# Patient Record
Sex: FEMALE | Race: WHITE | URBAN - METROPOLITAN AREA
[De-identification: names, ages, dates, MRNs, and addresses within clinical notes are randomized per-mention and may not be internally consistent; named-entity substitution may affect disease eponyms.]

---

## 2017-05-08 ENCOUNTER — OFFICE VISIT (OUTPATIENT)
Dept: FAMILY MEDICINE CLINIC | Age: 37
End: 2017-05-08

## 2017-05-08 VITALS
BODY MASS INDEX: 36.5 KG/M2 | TEMPERATURE: 98.3 F | HEIGHT: 63 IN | HEART RATE: 68 BPM | WEIGHT: 206 LBS | RESPIRATION RATE: 18 BRPM | DIASTOLIC BLOOD PRESSURE: 98 MMHG | OXYGEN SATURATION: 99 % | SYSTOLIC BLOOD PRESSURE: 149 MMHG

## 2017-05-08 DIAGNOSIS — B37.31 VAGINAL YEAST INFECTION: Primary | ICD-10-CM

## 2017-05-08 DIAGNOSIS — R03.0 ELEVATED BLOOD PRESSURE READING: ICD-10-CM

## 2017-05-08 DIAGNOSIS — B37.2 YEAST DERMATITIS: ICD-10-CM

## 2017-05-08 LAB
BILIRUB UR QL STRIP: NEGATIVE
GLUCOSE UR-MCNC: NEGATIVE MG/DL
KETONES P FAST UR STRIP-MCNC: NEGATIVE MG/DL
PH UR STRIP: 6 [PH] (ref 4.6–8)
PROT UR QL STRIP: NEGATIVE MG/DL
SP GR UR STRIP: 1.02 (ref 1–1.03)
UA UROBILINOGEN AMB POC: NORMAL (ref 0.2–1)
URINALYSIS CLARITY POC: CLEAR
URINALYSIS COLOR POC: YELLOW
URINE BLOOD POC: NORMAL
URINE LEUKOCYTES POC: NEGATIVE
URINE NITRITES POC: NEGATIVE

## 2017-05-08 RX ORDER — FLUCONAZOLE 150 MG/1
150 TABLET ORAL DAILY
Qty: 2 TAB | Refills: 0 | Status: SHIPPED | OUTPATIENT
Start: 2017-05-08 | End: 2017-05-09

## 2017-05-08 RX ORDER — CITALOPRAM 20 MG/1
TABLET, FILM COATED ORAL DAILY
COMMUNITY
End: 2017-05-15 | Stop reason: SDUPTHER

## 2017-05-08 RX ORDER — NYSTATIN AND TRIAMCINOLONE ACETONIDE 100000; 1 [USP'U]/G; MG/G
OINTMENT TOPICAL 2 TIMES DAILY
Qty: 30 G | Refills: 0 | Status: SHIPPED | OUTPATIENT
Start: 2017-05-08 | End: 2017-06-20 | Stop reason: ALTCHOICE

## 2017-05-08 NOTE — PROGRESS NOTES
HPI:    Chief Complaint   Patient presents with    New Patient     poss yeast infection        Summer Lanre Heck is a 29 y.o. female who is a new patient to me that presents for possible yeast infection with vaginal itching and burning and around the entire outside. She says that about two weeks ago she was at [facility] and was wearing a pair of athletic shorts that had mesh in it and thinks that they rubbed her raw. She had a yeast infection previously, last was in December. She denies any recent antibiotic use or other infections. She denies any odor, dysmenorrhea, or dyspareunia. Patient's last menstrual period was 04/14/2017 (approximate). She states her periods are regular with a moderate flow. There have been no recent changes in her menstrual cycle. She is sexually active with 1 partner ( in monogamous relationship). She currently uses nothing ( had vasectomy)  for birth control. Last pap: Within the last year and was normal (2/2017)  Last GYN eval: within the last year- normal.  Denies associated vaginal symptoms pain with sex. Denies STI concern or past history of STI. Denies associated urinary symptoms including burning, frequency, CVA tenderness or malodorous urine. Denies abdominal pain, nausea, vomiting or diarrhea. Denies polyuria or polydipsia. Denies fever, myalgias, chills, weakness, fatigue and other systemic symptoms. Feels well and ROS is otherwise negative. She says she does have a history of elevated blood pressure. Was on \"some medicine in the past but it made me feel bad and I stopped taking it\". She denies HA or vision changes. Says that she usually watches her diet, does not add salt but admits that she has gained weight in the past two years. She has not been exercising like she \"used to\" either, no formal exercise.       Past Medical History:   Diagnosis Date    Hypertension      Social History   Substance Use Topics    Smoking status: Current Some Day Smoker     Packs/day: 0.50    Smokeless tobacco: None    Alcohol use Yes     Current Outpatient Prescriptions   Medication Sig Dispense    citalopram (CELEXA) 20 mg tablet Take  by mouth daily. No current facility-administered medications for this visit. Not on File  Agree with nurses note. PE:  Visit Vitals    BP (!) 149/98 (BP 1 Location: Left arm, BP Patient Position: Sitting)    Pulse 68    Temp 98.3 °F (36.8 °C) (Oral)    Resp 18    Ht 5' 3\" (1.6 m)    Wt 206 lb (93.4 kg)    LMP 04/14/2017 (Approximate)    SpO2 99%    BMI 36.49 kg/m2     Gen: alert, oriented, no acute distress  HEENT:  Normocephalic, no conjunctival inflammation, PERRL,MMM, no oral lesions  Neck: midline trachea, normal thyroid  Resp: no increase work of breathing, lungs clear to ausculation bilaterally, no wheezing, rales or rhonchi  Breast: no skin changes, no nipple discharge, no palpable masses  CV: S1, S2 normal, no murmurs, rubs, or gallops. Abd: soft, not tender, not distended. No hepatosplenomegaly. Normal bowel sounds. No hernias. No abdominal or renal bruits. /gyn: external genitalia intact with inflammation- excoriated rash consistent with yeast dermatitis to bilateral groins, no ulceration, lesions or discharge. Neuro: cranial nerves intact, normal strength and movement in all extremities.   Skin: no lesion or rash noted elsewhere  Extremities: no cyanosis or edema    Results for orders placed or performed in visit on 05/08/17   AMB POC URINALYSIS DIP STICK AUTO W/O MICRO   Result Value Ref Range    Color (UA POC) Yellow     Clarity (UA POC) Clear     Glucose (UA POC) Negative Negative    Bilirubin (UA POC) Negative Negative    Ketones (UA POC) Negative Negative    Specific gravity (UA POC) 1.020 1.001 - 1.035    Blood (UA POC) Trace Negative    pH (UA POC) 6.0 4.6 - 8.0    Protein (UA POC) Negative Negative mg/dL    Urobilinogen (UA POC) 0.2 mg/dL 0.2 - 1    Nitrites (UA POC) Negative Negative    Leukocyte esterase (UA POC) Negative Negative           Assessment/Plan:  Differential diagnosis and treatment options reviewed with patient who is in agreement with treatment plan as outlined below. ICD-10-CM ICD-9-CM    1. Vaginal yeast infection B37.3 112.1 AMB POC URINALYSIS DIP STICK AUTO W/O MICRO      fluconazole (DIFLUCAN) 150 mg tablet   2. Yeast dermatitis B37.2 112.3 fluconazole (DIFLUCAN) 150 mg tablet      nystatin-triamcinolone (MYCOLOG) 100,000-0.1 unit/gram-% ointment   3. Elevated blood pressure reading R03.0 796.2        Discussed potential causes and care of yeast infection as well as prevention of future yeast infections. Preventive measures help to reduce factors that promote candidal infection and skin irritation. Encouraged her to avoid occlusive clothing and promote aeration of susceptible areas. Discussed safe sexual practices. Reviewed and given written instructions on symptomatic treatment, see AVS.  F/u if symptoms progress or do not improve    Discussed elevated BP. DASH diet encouraged and Discussed BMI and healthy weight. Encouraged patient to work to implement changes including diet high in raw fruits and vegetables, lean protein and good fats. Limit refined, processed carbohydrates and sugar. Encouraged regular exercise. Encouraged her to monitor her BP at home. Return in 3-4 weeks for physical and routine labs. Will discuss adding antihypertensive at that time. Verbal and written instructions (see AVS) provided. Patient expresses understanding and agreement of diagnosis and treatment plan.

## 2017-05-08 NOTE — MR AVS SNAPSHOT
Visit Information Date & Time Provider Department Dept. Phone Encounter #  
 5/8/2017  3:00 PM Mitra Pelletier, Praveen Schmitz [place] [address] [phone] [id number] Follow-up Instructions Return in about 4 weeks (around 6/5/2017), or if symptoms worsen or fail to improve. Upcoming Health Maintenance Date Due DTaP/Tdap/Td series (1 - Tdap) 5/28/2002 PAP AKA CERVICAL CYTOLOGY 5/28/2002 INFLUENZA AGE 9 TO ADULT 8/1/2017 Allergies as of 5/8/2017  Review Complete On: 5/8/2017 By: Marcelo Amaya Not on File Current Immunizations  Never Reviewed No immunizations on file. Not reviewed this visit You Were Diagnosed With   
  
 Codes Comments Vaginal yeast infection    -  Primary ICD-10-CM: B37.3 ICD-9-CM: 112.1 Yeast dermatitis     ICD-10-CM: B37.2 ICD-9-CM: 112.3 Elevated blood pressure reading     ICD-10-CM: R03.0 ICD-9-CM: 796.2 Vitals BP Pulse Temp Resp Height(growth percentile) Weight(growth percentile) (!) 149/98 (BP 1 Location: Left arm, BP Patient Position: Sitting) 68 98.3 °F (36.8 °C) (Oral) 18 5' 3\" (1.6 m) 206 lb (93.4 kg) LMP SpO2 BMI OB Status Smoking Status 04/14/2017 (Approximate) 99% 36.49 kg/m2 Having regular periods Current Some Day Smoker BMI and BSA Data Body Mass Index Body Surface Area  
 36.49 kg/m 2 2.04 m 2 Preferred Pharmacy Pharmacy Name Phone [facility] [phone] Your Updated Medication List  
  
   
This list is accurate as of: 5/8/17  3:57 PM.  Always use your most recent med list.  
  
  
  
  
 CeleXA 20 mg tablet Generic drug:  citalopram  
Take  by mouth daily. fluconazole 150 mg tablet Commonly known as:  DIFLUCAN Take 1 Tab by mouth daily for 1 day. Repeat dose in 3-5 days  
  
 nystatin-triamcinolone 100,000-0.1 unit/gram-% ointment Commonly known as:  Justice Baptise Apply  to affected area two (2) times a day. Prescriptions Sent to Pharmacy Refills  
 fluconazole (DIFLUCAN) 150 mg tablet 0 Sig: Take 1 Tab by mouth daily for 1 day. Repeat dose in 3-5 days Class: Normal  
 Pharmacy: [address] Ph #: [phone] Route: Oral  
 nystatin-triamcinolone (MYCOLOG) 100,000-0.1 unit/gram-% ointment 0 Sig: Apply  to affected area two (2) times a day. Class: Normal  
 Pharmacy: [address] Ph #: [phone] Route: Topical  
  
We Performed the Following AMB POC URINALYSIS DIP STICK AUTO W/O MICRO [51292 CPT(R)] Follow-up Instructions Return in about 4 weeks (around 6/5/2017), or if symptoms worsen or fail to improve. Patient Instructions Vaginal Yeast Infection: Care Instructions Your Care Instructions A vaginal yeast infection is caused by too many yeast cells in the vagina. This is common in women of all ages. Itching, vaginal discharge and irritation, and other symptoms can bother you. But yeast infections don't often cause other health problems. Some medicines can increase your risk of getting a yeast infection. These include antibiotics, birth control pills, hormones, and steroids. You may also be more likely to get a yeast infection if you are pregnant, have diabetes, douche, or wear tight clothes. With treatment, most yeast infections get better in 2 to 3 days. Follow-up care is a key part of your treatment and safety. Be sure to make and go to all appointments, and call your doctor if you are having problems. It's also a good idea to know your test results and keep a list of the medicines you take. How can you care for yourself at home? · Take your medicines exactly as prescribed. Call your doctor if you think you are having a problem with your medicine. · Ask your doctor about over-the-counter (OTC) medicines for yeast infections. They may cost less than prescription medicines. If you use an OTC treatment, read and follow all instructions on the label. · Do not use tampons while using a vaginal cream or suppository. The tampons can absorb the medicine. Use pads instead. · Wear loose cotton clothing. Do not wear nylon or other fabric that holds body heat and moisture close to the skin. · Try sleeping without underwear. · Do not scratch. Relieve itching with a cold pack or a cool bath. · Do not wash your vaginal area more than once a day. Use plain water or a mild, unscented soap. Air-dry the vaginal area. · Change out of wet swimsuits after swimming. · Do not have sex until you have finished your treatment. · Do not douche. When should you call for help? Call your doctor now or seek immediate medical care if: 
· You have unexpected vaginal bleeding. · You have new or increased pain in your vagina or pelvis. Watch closely for changes in your health, and be sure to contact your doctor if: 
· You have a fever. · You are not getting better after 2 days. · Your symptoms come back after you finish your medicines. Where can you learn more? Go to http://sonia-sagrario.info/. Enter U583 in the search box to learn more about \"Vaginal Yeast Infection: Care Instructions. \" Current as of: October 13, 2016 Content Version: 11.2 © 0343-4540 [employer]. Care instructions adapted under license by [employer] (which disclaims liability or warranty for this information). If you have questions about a medical condition or this instruction, always ask your healthcare professional. [place] [address] any warranty or liability for your use of this information. Candidiasis: Care Instructions Your Care Instructions Candidiasis (say \"zxx-mzl-ZR-uh-kriss\") is a yeast infection.  Yeast normally lives in your body. But it can cause problems if your body's defenses don't work as they should. Some medicines can increase your chance of getting a yeast infection. These include antibiotics, steroids, and cancer drugs. And some diseases like AIDS and diabetes can make you more likely to get yeast infections. There are different types of yeast infections. Elina Larar is a yeast infection in the mouth. It usually occurs in people with weak immune systems. It causes white patches inside the mouth and throat. Yeast infections of the skin usually occur in skin folds where the skin stays moist. They cause red, oozing patches on your skin. Babies can get these infections under the diaper. People who often wear gloves can get them on their hands. Many women get vaginal yeast infections. They are most common when women take antibiotics. These infections can cause the vagina to itch and burn. They also cause white discharge that looks like cottage cheese. In rare cases, yeast infects the blood. This can cause serious disease. This kind of infection is treated with medicine given through a needle into a vein (IV). After you start treatment, a yeast infection usually goes away quickly. But if your immune system is weak, the infection may come back. Tell your doctor if you get yeast infections often. Follow-up care is a key part of your treatment and safety. Be sure to make and go to all appointments, and call your doctor if you are having problems. It's also a good idea to know your test results and keep a list of the medicines you take. How can you care for yourself at home? · Take your medicines exactly as prescribed. Call your doctor if you think you are having a problem with your medicine. · Use antibiotics only as directed by your doctor. · Eat yogurt with live cultures. It has bacteria called lactobacillus. It may help prevent some types of yeast infections. · Keep your skin clean and dry. Put powder on moist places. · If you are using a cream or suppository to treat a vaginal yeast infection, don't use condoms or a diaphragm. Use a different type of birth control. · Eat a healthy diet and get regular exercise. This will help keep your immune system strong. When should you call for help? Call your doctor now or seek immediate medical care if: 
· You have a fever. · You are pregnant and have signs of a vaginal or urinary tract infection such as: ¨ Severe itching in your vagina. ¨ Pain during sex or when you urinate. ¨ Unusual discharge from your vagina. ¨ A frequent urge to urinate. ¨ Urine that is cloudy or smells bad. Watch closely for changes in your health, and be sure to contact your doctor if: 
· You do not get better as expected. Where can you learn more? Go to http://sonia-sagrario.info/. Enter D239 in the search box to learn more about \"Candidiasis: Care Instructions. \" Current as of: October 13, 2016 Content Version: 11.2 © 6885-5881 [employer]. Care instructions adapted under license by [employer] (which disclaims liability or warranty for this information). If you have questions about a medical condition or this instruction, always ask your healthcare professional. [address] any warranty or liability for your use of this information. Introducing [facility]! [employer] introduces PushPoint patient portal. Now you can access parts of your medical record, email your doctor's office, and request medication refills online. 1. In your internet browser, go to [email or web address] 2. Click on the First Time User? Click Here link in the Sign In box. You will see the New Member Sign Up page. 3. Enter your PushPoint Access Code exactly as it appears below. You will not need to use this code after youve completed the sign-up process.  If you do not sign up before the expiration date, you must request a new code. · [employer] Access Code: [email or web address] Expires: 8/6/2017  3:57 PM 
 
4. Enter the last four digits of your Social Security Number (xxxx) and Date of Birth (mm/dd/yyyy) as indicated and click Submit. You will be taken to the next sign-up page. 5. Create a [employer] ID. This will be your [employer] login ID and cannot be changed, so think of one that is secure and easy to remember. 6. Create a [employer] password. You can change your password at any time. 7. Enter your Password Reset Question and Answer. This can be used at a later time if you forget your password. 8. Enter your e-mail address. You will receive e-mail notification when new information is available in [address]. 9. Click Sign Up. You can now view and download portions of your medical record. 10. Click the Download Summary menu link to download a portable copy of your medical information. If you have questions, please visit the Frequently Asked Questions section of the [employer] website. Remember, [employer] is NOT to be used for urgent needs. For medical emergencies, dial 911. Now available from your iPhone and Android! Please provide this summary of care documentation to your next provider. Your primary care clinician is listed as ROHITH ROD. If you have any questions after today's visit, please call [phone].

## 2017-05-08 NOTE — PATIENT INSTRUCTIONS
Vaginal Yeast Infection: Care Instructions  Your Care Instructions  A vaginal yeast infection is caused by too many yeast cells in the vagina. This is common in women of all ages. Itching, vaginal discharge and irritation, and other symptoms can bother you. But yeast infections don't often cause other health problems. Some medicines can increase your risk of getting a yeast infection. These include antibiotics, birth control pills, hormones, and steroids. You may also be more likely to get a yeast infection if you are pregnant, have diabetes, douche, or wear tight clothes. With treatment, most yeast infections get better in 2 to 3 days. Follow-up care is a key part of your treatment and safety. Be sure to make and go to all appointments, and call your doctor if you are having problems. It's also a good idea to know your test results and keep a list of the medicines you take. How can you care for yourself at home? · Take your medicines exactly as prescribed. Call your doctor if you think you are having a problem with your medicine. · Ask your doctor about over-the-counter (OTC) medicines for yeast infections. They may cost less than prescription medicines. If you use an OTC treatment, read and follow all instructions on the label. · Do not use tampons while using a vaginal cream or suppository. The tampons can absorb the medicine. Use pads instead. · Wear loose cotton clothing. Do not wear nylon or other fabric that holds body heat and moisture close to the skin. · Try sleeping without underwear. · Do not scratch. Relieve itching with a cold pack or a cool bath. · Do not wash your vaginal area more than once a day. Use plain water or a mild, unscented soap. Air-dry the vaginal area. · Change out of wet swimsuits after swimming. · Do not have sex until you have finished your treatment. · Do not douche. When should you call for help?   Call your doctor now or seek immediate medical care if:  · You have unexpected vaginal bleeding. · You have new or increased pain in your vagina or pelvis. Watch closely for changes in your health, and be sure to contact your doctor if:  · You have a fever. · You are not getting better after 2 days. · Your symptoms come back after you finish your medicines. Where can you learn more? Go to http://sonia-sagrario.info/. Enter A039 in the search box to learn more about \"Vaginal Yeast Infection: Care Instructions. \"  Current as of: October 13, 2016  Content Version: 11.2  © 3948-1856 [employer]. Care instructions adapted under license by [employer] (which disclaims liability or warranty for this information). If you have questions about a medical condition or this instruction, always ask your healthcare professional. [address] any warranty or liability for your use of this information. Candidiasis: Care Instructions  Your Care Instructions  Candidiasis (say \"mbi-htp-SA-uh-kriss\") is a yeast infection. Yeast normally lives in your body. But it can cause problems if your body's defenses don't work as they should. Some medicines can increase your chance of getting a yeast infection. These include antibiotics, steroids, and cancer drugs. And some diseases like AIDS and diabetes can make you more likely to get yeast infections. There are different types of yeast infections. Arvie Scrape is a yeast infection in the mouth. It usually occurs in people with weak immune systems. It causes white patches inside the mouth and throat. Yeast infections of the skin usually occur in skin folds where the skin stays moist. They cause red, oozing patches on your skin. Babies can get these infections under the diaper. People who often wear gloves can get them on their hands. Many women get vaginal yeast infections. They are most common when women take antibiotics. These infections can cause the vagina to itch and burn.  They also cause white discharge that looks like cottage cheese. In rare cases, yeast infects the blood. This can cause serious disease. This kind of infection is treated with medicine given through a needle into a vein (IV). After you start treatment, a yeast infection usually goes away quickly. But if your immune system is weak, the infection may come back. Tell your doctor if you get yeast infections often. Follow-up care is a key part of your treatment and safety. Be sure to make and go to all appointments, and call your doctor if you are having problems. It's also a good idea to know your test results and keep a list of the medicines you take. How can you care for yourself at home? · Take your medicines exactly as prescribed. Call your doctor if you think you are having a problem with your medicine. · Use antibiotics only as directed by your doctor. · Eat yogurt with live cultures. It has bacteria called lactobacillus. It may help prevent some types of yeast infections. · Keep your skin clean and dry. Put powder on moist places. · If you are using a cream or suppository to treat a vaginal yeast infection, don't use condoms or a diaphragm. Use a different type of birth control. · Eat a healthy diet and get regular exercise. This will help keep your immune system strong. When should you call for help? Call your doctor now or seek immediate medical care if:  · You have a fever. · You are pregnant and have signs of a vaginal or urinary tract infection such as:  ¨ Severe itching in your vagina. ¨ Pain during sex or when you urinate. ¨ Unusual discharge from your vagina. ¨ A frequent urge to urinate. ¨ Urine that is cloudy or smells bad. Watch closely for changes in your health, and be sure to contact your doctor if:  · You do not get better as expected. Where can you learn more? Go to http://sonia-sagrario.info/.   Enter A064 in the search box to learn more about \"Candidiasis: Care Instructions. \"  Current as of: October 13, 2016  Content Version: 11.2  © 3075-2715 [employer], [facility]. Care instructions adapted under license by [employer] (which disclaims liability or warranty for this information). If you have questions about a medical condition or this instruction, always ask your healthcare professional. [place] [address] any warranty or liability for your use of this information.

## 2017-05-15 ENCOUNTER — OFFICE VISIT (OUTPATIENT)
Dept: FAMILY MEDICINE CLINIC | Age: 37
End: 2017-05-15

## 2017-05-15 VITALS
WEIGHT: 204 LBS | HEIGHT: 63 IN | HEART RATE: 75 BPM | BODY MASS INDEX: 36.14 KG/M2 | OXYGEN SATURATION: 96 % | TEMPERATURE: 98.3 F | DIASTOLIC BLOOD PRESSURE: 89 MMHG | RESPIRATION RATE: 18 BRPM | SYSTOLIC BLOOD PRESSURE: 137 MMHG

## 2017-05-15 DIAGNOSIS — F41.9 ANXIETY AND DEPRESSION: ICD-10-CM

## 2017-05-15 DIAGNOSIS — R03.0 TRANSIENT ELEVATED BLOOD PRESSURE: ICD-10-CM

## 2017-05-15 DIAGNOSIS — Z13.220 SCREENING FOR HYPERLIPIDEMIA: ICD-10-CM

## 2017-05-15 DIAGNOSIS — Z13.29 SCREENING FOR THYROID DISORDER: ICD-10-CM

## 2017-05-15 DIAGNOSIS — Z00.00 ANNUAL PHYSICAL EXAM: Primary | ICD-10-CM

## 2017-05-15 DIAGNOSIS — Z13.21 ENCOUNTER FOR VITAMIN DEFICIENCY SCREENING: ICD-10-CM

## 2017-05-15 DIAGNOSIS — Z86.32 HISTORY OF GESTATIONAL DIABETES: ICD-10-CM

## 2017-05-15 DIAGNOSIS — F32.A ANXIETY AND DEPRESSION: ICD-10-CM

## 2017-05-15 LAB
BILIRUB UR QL STRIP: NEGATIVE
GLUCOSE UR-MCNC: NEGATIVE MG/DL
KETONES P FAST UR STRIP-MCNC: NEGATIVE MG/DL
PH UR STRIP: 6.5 [PH] (ref 4.6–8)
PROT UR QL STRIP: NEGATIVE MG/DL
SP GR UR STRIP: 1.01 (ref 1–1.03)
UA UROBILINOGEN AMB POC: NORMAL (ref 0.2–1)
URINALYSIS CLARITY POC: CLEAR
URINALYSIS COLOR POC: YELLOW
URINE BLOOD POC: NEGATIVE
URINE LEUKOCYTES POC: NEGATIVE
URINE NITRITES POC: NEGATIVE

## 2017-05-15 RX ORDER — CITALOPRAM 20 MG/1
10 TABLET, FILM COATED ORAL DAILY
Qty: 30 TAB | Refills: 0
Start: 2017-05-15 | End: 2017-08-14 | Stop reason: SDUPTHER

## 2017-05-15 NOTE — PATIENT INSTRUCTIONS

## 2017-05-15 NOTE — MR AVS SNAPSHOT
Visit Information Date & Time Provider Department Dept. Phone Encounter #  
 5/15/2017 10:30 AM Mook Young NP [facility] [address] [address] [phone] [id number] Follow-up Instructions Return in about 3 months (around 8/15/2017), or if symptoms worsen or fail to improve. Upcoming Health Maintenance Date Due Pneumococcal 19-64 Medium Risk (1 of 1 - PPSV23) 5/28/2000 DTaP/Tdap/Td series (1 - Tdap) 5/28/2002 PAP AKA CERVICAL CYTOLOGY 5/28/2002 INFLUENZA AGE 9 TO ADULT 8/1/2017 Allergies as of 5/15/2017  Review Complete On: 5/15/2017 By: Mook Young NP No Known Allergies Current Immunizations  Never Reviewed No immunizations on file. Not reviewed this visit You Were Diagnosed With   
  
 Codes Comments Annual physical exam    -  Primary ICD-10-CM: Z00.00 ICD-9-CM: V70.0 Anxiety and depression     ICD-10-CM: F41.9, F32.9 ICD-9-CM: 300.00, 311 History of gestational diabetes     ICD-10-CM: Z86.32 
ICD-9-CM: V12.21 Screening for hyperlipidemia     ICD-10-CM: Z13.220 ICD-9-CM: V77.91 Screening for thyroid disorder     ICD-10-CM: Z13.29 ICD-9-CM: V77.0 Encounter for vitamin deficiency screening     ICD-10-CM: Z13.21 ICD-9-CM: V77.99 Vitals BP Pulse Temp Resp Height(growth percentile) Weight(growth percentile) 137/89 (BP 1 Location: Left arm, BP Patient Position: Sitting) 75 98.3 °F (36.8 °C) (Oral) 18 5' 3\" (1.6 m) 204 lb (92.5 kg) LMP SpO2 BMI OB Status Smoking Status 05/11/2017 96% 36.14 kg/m2 Having regular periods Current Some Day Smoker BMI and BSA Data Body Mass Index Body Surface Area  
 36.14 kg/m 2 2.03 m 2 Preferred Pharmacy Pharmacy Name Phone [facility] [phone] Your Updated Medication List  
  
   
This list is accurate as of: 5/15/17 11:06 AM.  Always use your most recent med list.  
  
  
 citalopram 20 mg tablet Commonly known as:  Tunde Hadley Take 0.5 Tabs by mouth daily. nystatin-triamcinolone 100,000-0.1 unit/gram-% ointment Commonly known as:  Olivo Nanas Apply  to affected area two (2) times a day. We Performed the Following AMB POC URINALYSIS DIP STICK AUTO W/O MICRO [32585 CPT(R)] CBC WITH AUTOMATED DIFF [89333 CPT(R)] LIPID PANEL [18321 CPT(R)] METABOLIC PANEL, COMPREHENSIVE [48490 CPT(R)] TSH 3RD GENERATION [78044 CPT(R)] VITAMIN D, 25 HYDROXY [id number] CPT(R)] Follow-up Instructions Return in about 3 months (around 8/15/2017), or if symptoms worsen or fail to improve. Patient Instructions Well Visit, Ages 25 to 48: Care Instructions Your Care Instructions Physical exams can help you stay healthy. Your doctor has checked your overall health and may have suggested ways to take good care of yourself. He or she also may have recommended tests. At home, you can help prevent illness with healthy eating, regular exercise, and other steps. Follow-up care is a key part of your treatment and safety. Be sure to make and go to all appointments, and call your doctor if you are having problems. It's also a good idea to know your test results and keep a list of the medicines you take. How can you care for yourself at home? · Reach and stay at a healthy weight. This will lower your risk for many problems, such as obesity, diabetes, heart disease, and high blood pressure. · Get at least 30 minutes of physical activity on most days of the week. Walking is a good choice. You also may want to do other activities, such as running, swimming, cycling, or playing tennis or team sports. Discuss any changes in your exercise program with your doctor. · Do not smoke or allow others to smoke around you. If you need help quitting, talk to your doctor about stop-smoking programs and medicines. These can increase your chances of quitting for good. · Talk to your doctor about whether you have any risk factors for sexually transmitted infections (STIs). Having one sex partner (who does not have STIs and does not have sex with anyone else) is a good way to avoid these infections. · Use birth control if you do not want to have children at this time. Talk with your doctor about the choices available and what might be best for you. · Protect your skin from too much sun. When you're outdoors from 10 a.m. to 4 p.m., stay in the shade or cover up with clothing and a hat with a wide brim. Wear sunglasses that block UV rays. Even when it's cloudy, put broad-spectrum sunscreen (SPF 30 or higher) on any exposed skin. · See a dentist one or two times a year for checkups and to have your teeth cleaned. · Wear a seat belt in the car. · Drink alcohol in moderation, if at all. That means no more than 2 drinks a day for men and 1 drink a day for women. Follow your doctor's advice about when to have certain tests. These tests can spot problems early. For everyone · Cholesterol. Have the fat (cholesterol) in your blood tested after age 21. Your doctor will tell you how often to have this done based on your age, family history, or other things that can increase your risk for heart disease. · Blood pressure. Have your blood pressure checked during a routine doctor visit. Your doctor will tell you how often to check your blood pressure based on your age, your blood pressure results, and other factors. · Vision. Talk with your doctor about how often to have a glaucoma test. 
· Diabetes. Ask your doctor whether you should have tests for diabetes. · Colon cancer. Have a test for colon cancer at age 48. You may have one of several tests. If you are younger than 48, you may need a test earlier if you have any risk factors.  Risk factors include whether you already had a precancerous polyp removed from your colon or whether your parent, brother, sister, or child has had colon cancer. For women · Breast exam and mammogram. Talk to your doctor about when you should have a clinical breast exam and a mammogram. Medical experts differ on whether and how often women under 50 should have these tests. Your doctor can help you decide what is right for you. · Pap test and pelvic exam. Begin Pap tests at age 24. A Pap test is the best way to find cervical cancer. The test often is part of a pelvic exam. Ask how often to have this test. 
· Tests for sexually transmitted infections (STIs). Ask whether you should have tests for STIs. You may be at risk if you have sex with more than one person, especially if your partners do not wear condoms. For men · Tests for sexually transmitted infections (STIs). Ask whether you should have tests for STIs. You may be at risk if you have sex with more than one person, especially if you do not wear a condom. · Testicular cancer exam. Ask your doctor whether you should check your testicles regularly. · Prostate exam. Talk to your doctor about whether you should have a blood test (called a PSA test) for prostate cancer. Experts differ on whether and when men should have this test. Some experts suggest it if you are older than 39 and are -American or have a father or brother who got prostate cancer when he was younger than 72. When should you call for help? Watch closely for changes in your health, and be sure to contact your doctor if you have any problems or symptoms that concern you. Where can you learn more? Go to http://sonia-sagrario.info/. Enter P072 in the search box to learn more about \"Well Visit, Ages 25 to 48: Care Instructions. \" Current as of: July 19, 2016 Content Version: 11.2 © 1966-6897 [employer].  Care instructions adapted under license by [address] (which disclaims liability or warranty for this information). If you have questions about a medical condition or this instruction, always ask your healthcare professional. [address] any warranty or liability for your use of this information. Introducing [facility]! Lion Diaz introduces [employer] patient portal. Now you can access parts of your medical record, email your doctor's office, and request medication refills online. 1. In your internet browser, go to [email or web address] 2. Click on the First Time User? Click Here link in the Sign In box. You will see the New Member Sign Up page. 3. Enter your Tiende[employer] Access Code exactly as it appears below. You will not need to use this code after youve completed the sign-up process. If you do not sign up before the expiration date, you must request a new code. · [employer]endeo Access Code: [email or web address] Expires: 8/6/2017  3:57 PM 
 
4. Enter the last four digits of your Social Security Number (xxxx) and Date of Birth (mm/dd/yyyy) as indicated and click Submit. You will be taken to the next sign-up page. 5. Create a Tiendeo ID. This will be your Tiendeo login ID and cannot be changed, so think of one that is secure and easy to remember. 6. Create a Tiendeo password. You can change your password at any time. 7. Enter your Password Reset Question and Answer. This can be used at a later time if you forget your password. 8. Enter your e-mail address. You will receive e-mail notification when new information is available in [address] 9. Click Sign Up. You can now view and download portions of your medical record. 10. Click the Download Summary menu link to download a portable copy of your medical information. If you have questions, please visit the Frequently Asked Questions section of the [employer] website.  Remember, [employer]o is NOT to be used for urgent needs. For medical emergencies, dial 911. Now available from your iPhone and Android! Please provide this summary of care documentation to your next provider. Your primary care clinician is listed as ROHITH ROD. If you have any questions after today's visit, please call [phone].

## 2017-05-15 NOTE — PROGRESS NOTES
Chief Complaint   Patient presents with    Annual Exam         S: Amisha Cabrera is a 29 y.o. female No obstetric history on file. who presents for well woman exam and pap. Last pap- normal, done a few months ago prior to moving here. Routine Labs due today. Was seen last week for yeast infection, treated and she says she is \"cleared up\", no vaginal complaints. Pt is well, has no complaints at this time and denies any recent illness. Patient's last menstrual period was 05/11/2017. Sex- , monogamous relationship. No concerns   Birth control- not on any BC,  had vasectomy. Children- 2 sons ages 8 and 15  Denies excessive cramping, bloating, moodiness or breast tenderness. Denies other GYN symptoms including discharge, itching, dyspareunia, or recent changes in cycle. Denies any systemic symptoms including fever, myalgias, chills, weakness, weight loss and fatigue. Denies respiratory symptoms including cough, SOB, or wheezing. Denies any cardiac symptoms including chest pain, tightness or discomfort or syncope. ROS is otherwise negative. Nutrition:  Eats well balanced diet. Physical excercise:  Exercise 5-6 days per week  Social:  Denies any recent stressors. In fact, would like to try to come off celexa. Has been on for 12 years, went on because her  was  and she was overstressed and depressed with  being deployed. Tobacco: social smoking only. Alcohol: drinks socially on weekend only. Past Medical History:   Diagnosis Date    Anxiety and depression     Hypertension      History reviewed. No pertinent surgical history. No Known Allergies  Current Outpatient Prescriptions   Medication Sig Dispense Refill    citalopram (CELEXA) 20 mg tablet Take  by mouth daily.  nystatin-triamcinolone (MYCOLOG) 100,000-0.1 unit/gram-% ointment Apply  to affected area two (2) times a day. 30 g 0       Agree with nurses note.       O: VS:   Visit Vitals    /89 (BP 1 Location: Left arm, BP Patient Position: Sitting)    Pulse 75    Temp 98.3 °F (36.8 °C) (Oral)    Resp 18    Ht 5' 3\" (1.6 m)    Wt 204 lb (92.5 kg)    LMP 05/11/2017    SpO2 96%    BMI 36.14 kg/m2     PAIN: No complaints of pain today. GENERAL: Halley Noyola is sitting on the table in no acute distress. Non-toxic. Well nourished. Well developed. Appropriately groomed. She is friendly, social and cooperative. HEAD:  Normocephalic. Atraumatic. Non tender sinuses x 4. EYE: PERRLA. EOMs intact. Sclera anicteric without injection. No drainage or discharge. EARS: Hearing intact bilaterally. External ear canals normal without evidence of blood or swelling. Bilateral TM's intact, pearly grey with landmarks visible. No erythema or effusion. NOSE: Patent. Nasal turbinates pink. No polyps noted. No erythema. No discharge. MOUTH: mucous membranes pink and moist. Posterior pharynx normal with cobblestone appearance. No erythema, white exudate or obstruction. NECK: supple. Midline trachea. No thyromegaly noted. RESP: Breath sounds are symmetrical bilaterally. Unlabored without SOB. Speaking in full sentences. Clear to auscultation bilaterally anteriorly and posteriorly. No wheezes. No rales or rhonchi. CV: normal rate. Regular rhythm. S1, S2 audible. No murmur noted. No rubs, clicks or gallops noted. ABDOMEN: Flat without bulges or pulsations. Soft and nondistended. No tenderness on palpation. No masses or organomegaly. No rebound, rigidity or guarding. Bowel sounds normal x 4 quadrants. BACK: No visible deformities or curvature. Full ROM. No pain on palpation of the spinous processes in the cervical, thoracic, lumbar, sacral regions. No CVA tenderness. NEURO:  awake, alert and oriented to person, place, and time and event. Clear speech. Muscle strength is +5/5 x 4 extremities. Steady gait. MUSC:  Intact x 4 extremities.   Full ROM x 4 extremities. No pain with movement. HEME/LYMPH: peripheral pulses palpable 2+ x 4 extremities. No peripheral edema is noted. No cervical adenopathy noted. SKIN: clean dry and intact throughout. No rashes, erythema, ecchymosis, lacerations, abrasions, suspicious moles noted. PSYCH: appropriate behavior, dress and thought processes. Good eye contact. Clear and coherent speech. Full affect. Good insight. Results for orders placed or performed in visit on 05/15/17   AMB POC URINALYSIS DIP STICK AUTO W/O MICRO   Result Value Ref Range    Color (UA POC) Yellow     Clarity (UA POC) Clear     Glucose (UA POC) Negative Negative    Bilirubin (UA POC) Negative Negative    Ketones (UA POC) Negative Negative    Specific gravity (UA POC) 1.015 1.001 - 1.035    Blood (UA POC) Negative Negative    pH (UA POC) 6.5 4.6 - 8.0    Protein (UA POC) Negative Negative mg/dL    Urobilinogen (UA POC) 0.2 mg/dL 0.2 - 1    Nitrites (UA POC) Negative Negative    Leukocyte esterase (UA POC) Negative Negative       Assessment: Wellness examination    Plan:  Differential diagnosis and treatment options reviewed with patient who is in agreement with treatment plan as outlined below. ICD-10-CM ICD-9-CM    1. Annual physical exam Z00.00 V70.0 AMB POC URINALYSIS DIP STICK AUTO W/O MICRO      LIPID PANEL      METABOLIC PANEL, COMPREHENSIVE      CBC WITH AUTOMATED DIFF      TSH 3RD GENERATION      VITAMIN D, 25 HYDROXY   2. Anxiety and depression F41.9 300.00 citalopram (CELEXA) 20 mg tablet    F32.9 311    3. History of gestational diabetes Z86.32 V12.21    4. Screening for hyperlipidemia Z13.220 V77.91 LIPID PANEL   5. Screening for thyroid disorder Z13.29 V77.0 TSH 3RD GENERATION   6. Encounter for vitamin deficiency screening Z13.21 V77.99 VITAMIN D, 25 HYDROXY   7. Transient elevated blood pressure R03.0 796.2      Fasting labs today. Has history of gestational DM, will check HgbA1c as well.   She has old medical records for me to review. Working on weight loss, thinks that her BP will continue to come down with her exercise and weight reduction. Discussed BMI and healthy weight. Encouraged patient to work to implement changes including diet high in raw fruits and vegetables, lean protein and good fats. Limit refined, processed carbohydrates and sugar. Encouraged regular exercise. Discussed wean off of celexa per her request.  She has enough 20 mg tabs that she does not need refill at this time, will just cut her current pills in half. Advised to take 10 mg for two weeks then take 10 mg every other day for a week then take 10 mg 2 times per week for a week and then may stop. If symptoms (anxiety) return, then restart. Discussed health maintenance screening guidelines  Advised on nutrition, physical activity, weight management, tobacco, alcohol and safety    Follow up in 1 year for annual wellness visit, 3 months for BP check and follow up on celexa wean off.      HEALTH MAINTENANCE GOALS & RECOMMENDATIONS (Included in AVS):  · Attain and/or maintain a healthy weight (BMI between 18 and 30)  · Attain and/or maintain regular physical activity for 30 minutes 5 days/week   · Eat at least 5 servings of fruits and vegetables daily, preferably fresh  · Limit fast food and prepared foods  · Attain and/or maintain healthy blood pressure   · Attain and/or maintain no nicotine/tobacco use status  · Annual flu shot (or nasal mist as appropriate)  · Stay up-to-date with immunizations including tetanus, pertussis, HPV, & pneumonia  · Annual eye exam   · Biannual dental visits  · Annual skin cancer screening  · Annual gynecologic visit for women over age 24  · Annual prostate exam for black men starting at age 36, and for other races starting at age 48 (unless indicated earlier by history)  · Colonscopy every 10 years after age 48 (unless indicated more frequently by history)  · Consider daily 81mg aspirin for men over age 39 and women over age 54  · Annual screening labs: thyroid tests (TSH and T4), complete blood count, lipid panel (cholesterol), hemoglobin A1c (diabetes screening), comprehensive metabolic panel (includes kidney function, liver function, and electrolytes), vitamin D level, urinalysis (with urine culture and microalbumin as medically indicated)  · Consider annual testing for sexually transmitted infections including HIV  · Screening bone density testing in all women over age 72    Verbal and written instructions (see AVS) provided. Patient expresses understanding and agreement of diagnosis and treatment plan.

## 2017-05-16 LAB
25(OH)D3+25(OH)D2 SERPL-MCNC: 27.9 NG/ML (ref 30–100)
ALBUMIN SERPL-MCNC: 4.6 G/DL (ref 3.5–5.5)
ALBUMIN/GLOB SERPL: 1.8 {RATIO} (ref 1.2–2.2)
ALP SERPL-CCNC: 66 IU/L (ref 39–117)
ALT SERPL-CCNC: 21 IU/L (ref 0–32)
AST SERPL-CCNC: 25 IU/L (ref 0–40)
BASOPHILS # BLD AUTO: 0 X10E3/UL (ref 0–0.2)
BASOPHILS NFR BLD AUTO: 0 %
BILIRUB SERPL-MCNC: 0.2 MG/DL (ref 0–1.2)
BUN SERPL-MCNC: 10 MG/DL (ref 6–20)
BUN/CREAT SERPL: 16 (ref 9–23)
CALCIUM SERPL-MCNC: 9.3 MG/DL (ref 8.7–10.2)
CHLORIDE SERPL-SCNC: 98 MMOL/L (ref 96–106)
CHOLEST SERPL-MCNC: 173 MG/DL (ref 100–199)
CO2 SERPL-SCNC: 22 MMOL/L (ref 18–29)
CREAT SERPL-MCNC: 0.62 MG/DL (ref 0.57–1)
EOSINOPHIL # BLD AUTO: 0.2 X10E3/UL (ref 0–0.4)
EOSINOPHIL NFR BLD AUTO: 3 %
ERYTHROCYTE [DISTWIDTH] IN BLOOD BY AUTOMATED COUNT: 13.9 % (ref 12.3–15.4)
GLOBULIN SER CALC-MCNC: 2.5 G/DL (ref 1.5–4.5)
GLUCOSE SERPL-MCNC: 82 MG/DL (ref 65–99)
HCT VFR BLD AUTO: 35.8 % (ref 34–46.6)
HDLC SERPL-MCNC: 54 MG/DL
HGB BLD-MCNC: 12.1 G/DL (ref 11.1–15.9)
IMM GRANULOCYTES # BLD: 0 X10E3/UL (ref 0–0.1)
IMM GRANULOCYTES NFR BLD: 0 %
INTERPRETATION, 910389: NORMAL
LDLC SERPL CALC-MCNC: 98 MG/DL (ref 0–99)
LYMPHOCYTES # BLD AUTO: 2.3 X10E3/UL (ref 0.7–3.1)
LYMPHOCYTES NFR BLD AUTO: 31 %
MCH RBC QN AUTO: 28.3 PG (ref 26.6–33)
MCHC RBC AUTO-ENTMCNC: 33.8 G/DL (ref 31.5–35.7)
MCV RBC AUTO: 84 FL (ref 79–97)
MONOCYTES # BLD AUTO: 0.4 X10E3/UL (ref 0.1–0.9)
MONOCYTES NFR BLD AUTO: 5 %
NEUTROPHILS # BLD AUTO: 4.5 X10E3/UL (ref 1.4–7)
NEUTROPHILS NFR BLD AUTO: 61 %
PLATELET # BLD AUTO: 330 X10E3/UL (ref 150–379)
POTASSIUM SERPL-SCNC: 4 MMOL/L (ref 3.5–5.2)
PROT SERPL-MCNC: 7.1 G/DL (ref 6–8.5)
RBC # BLD AUTO: 4.28 X10E6/UL (ref 3.77–5.28)
SODIUM SERPL-SCNC: 138 MMOL/L (ref 134–144)
TRIGL SERPL-MCNC: 105 MG/DL (ref 0–149)
TSH SERPL DL<=0.005 MIU/L-ACNC: 2.61 UIU/ML (ref 0.45–4.5)
VLDLC SERPL CALC-MCNC: 21 MG/DL (ref 5–40)
WBC # BLD AUTO: 7.5 X10E3/UL (ref 3.4–10.8)

## 2017-05-16 NOTE — PROGRESS NOTES
Please let her know that her labs are back and look good except her vitamin d level is a little low. I recommend taking a daily over the counter vitamin d supplement of 2000u per day. No DM and thyroid is normal as well. Let me know if she has any questions.    Thanks   Fred MARCANO

## 2017-06-20 ENCOUNTER — OFFICE VISIT (OUTPATIENT)
Dept: FAMILY MEDICINE CLINIC | Age: 37
End: 2017-06-20

## 2017-06-20 VITALS
RESPIRATION RATE: 18 BRPM | WEIGHT: 203 LBS | DIASTOLIC BLOOD PRESSURE: 80 MMHG | BODY MASS INDEX: 35.97 KG/M2 | HEART RATE: 82 BPM | SYSTOLIC BLOOD PRESSURE: 123 MMHG | OXYGEN SATURATION: 97 % | HEIGHT: 63 IN | TEMPERATURE: 98.2 F

## 2017-06-20 DIAGNOSIS — B37.31 CANDIDA VAGINITIS: Primary | ICD-10-CM

## 2017-06-20 RX ORDER — FLUCONAZOLE 150 MG/1
150 TABLET ORAL
Qty: 6 TAB | Refills: 0 | Status: SHIPPED | OUTPATIENT
Start: 2017-06-20 | End: 2017-07-26

## 2017-06-20 NOTE — PATIENT INSTRUCTIONS

## 2017-06-20 NOTE — MR AVS SNAPSHOT
Visit Information Date & Time Provider Department Dept. Phone Encounter #  
 6/20/2017  3:00 PM Jenise Holder [facility] [phone] [id number] Upcoming Health Maintenance Date Due INFLUENZA AGE 9 TO ADULT 8/1/2017 PAP AKA CERVICAL CYTOLOGY 5/15/2020 DTaP/Tdap/Td series (2 - Td) 5/15/2027 Allergies as of 6/20/2017  Review Complete On: 6/20/2017 By: Jenise Holder MD  
 No Known Allergies Current Immunizations  Never Reviewed Name Date Influenza Vaccine 10/30/2015, 10/17/2014, 11/26/2012, 11/5/2008, 11/2/2007 Influenza Vaccine (Quad) PF 10/17/2016 TB Skin Test (PPD) 8/7/2012 Not reviewed this visit Vitals BP Pulse Temp Resp Height(growth percentile) Weight(growth percentile) 123/80 (BP 1 Location: Left arm, BP Patient Position: Sitting) 82 98.2 °F (36.8 °C) (Oral) 18 5' 3\" (1.6 m) 203 lb (92.1 kg) SpO2 BMI OB Status Smoking Status 97% 35.96 kg/m2 Having regular periods Current Some Day Smoker BMI and BSA Data Body Mass Index Body Surface Area 35.96 kg/m 2 2.02 m 2 Preferred Pharmacy Pharmacy Name Phone [facility] [address] [phone] Your Updated Medication List  
  
   
This list is accurate as of: 6/20/17  4:15 PM.  Always use your most recent med list.  
  
  
  
  
 citalopram 20 mg tablet Commonly known as:  Rhunette Xiomara Take 0.5 Tabs by mouth daily. fluconazole 150 mg tablet Commonly known as:  DIFLUCAN Take 1 Tab by mouth every seven (7) days for 6 doses. FDA advises cautious prescribing of oral fluconazole in pregnancy. Prescriptions Sent to Pharmacy Refills  
 fluconazole (DIFLUCAN) 150 mg tablet 0 Sig: Take 1 Tab by mouth every seven (7) days for 6 doses. FDA advises cautious prescribing of oral fluconazole in pregnancy.   
 Class: Normal  
 Pharmacy: [facility] [address] [facility] #: [phone] Route: Oral  
  
Patient Instructions Vaginal Yeast Infection: Care Instructions Your Care Instructions A vaginal yeast infection is caused by too many yeast cells in the vagina. This is common in women of all ages. Itching, vaginal discharge and irritation, and other symptoms can bother you. But yeast infections don't often cause other health problems. Some medicines can increase your risk of getting a yeast infection. These include antibiotics, birth control pills, hormones, and steroids. You may also be more likely to get a yeast infection if you are pregnant, have diabetes, douche, or wear tight clothes. With treatment, most yeast infections get better in 2 to 3 days. Follow-up care is a key part of your treatment and safety. Be sure to make and go to all appointments, and call your doctor if you are having problems. It's also a good idea to know your test results and keep a list of the medicines you take. How can you care for yourself at home? · Take your medicines exactly as prescribed. Call your doctor if you think you are having a problem with your medicine. · Ask your doctor about over-the-counter (OTC) medicines for yeast infections. They may cost less than prescription medicines. If you use an OTC treatment, read and follow all instructions on the label. · Do not use tampons while using a vaginal cream or suppository. The tampons can absorb the medicine. Use pads instead. · Wear loose cotton clothing. Do not wear nylon or other fabric that holds body heat and moisture close to the skin. · Try sleeping without underwear. · Do not scratch. Relieve itching with a cold pack or a cool bath. · Do not wash your vaginal area more than once a day. Use plain water or a mild, unscented soap. Air-dry the vaginal area. · Change out of wet swimsuits after swimming. · Do not have sex until you have finished your treatment. · Do not douche. When should you call for help? Call your doctor now or seek immediate medical care if: 
· You have unexpected vaginal bleeding. · You have new or increased pain in your vagina or pelvis. Watch closely for changes in your health, and be sure to contact your doctor if: 
· You have a fever. · You are not getting better after 2 days. · Your symptoms come back after you finish your medicines. Where can you learn more? Go to http://sonia-sagrario.info/. Enter K294 in the search box to learn more about \"Vaginal Yeast Infection: Care Instructions. \" Current as of: October 13, 2016 Content Version: 11.3 © 5270-3361 [employer]. Care instructions adapted under license by [employer] (which disclaims liability or warranty for this information). If you have questions about a medical condition or this instruction, always ask your healthcare professional. [place] [address] any warranty or liability for your use of this information. Introducing [facility] & HEALTH SERVICES! [facility] introduces [employer] patient portal. Now you can access parts of your medical record, email your doctor's office, and request medication refills online. 1. In your internet browser, go to [email or web address] 2. Click on the First Time User? Click Here link in the Sign In box. You will see the New Member Sign Up page. 3. Enter your [employer] Access Code exactly as it appears below. You will not need to use this code after youve completed the sign-up process. If you do not sign up before the expiration date, you must request a new code. · [employer] Access Code: [email or web address] Expires: 8/6/2017  3:57 PM 
 
4. Enter the last four digits of your Social Security Number (xxxx) and Date of Birth (mm/dd/yyyy) as indicated and click Submit. You will be taken to the next sign-up page. 5. Create a [email or web address].su ID. This will be your [email or web address] login ID and cannot be changed, so think of one that is secure and easy to remember. 6. Create a [email or web address].su password. You can change your password at any time. 7. Enter your Password Reset Question and Answer. This can be used at a later time if you forget your password. 8. Enter your e-mail address. You will receive e-mail notification when new information is available in [address] 9. Click Sign Up. You can now view and download portions of your medical record. 10. Click the Download Summary menu link to download a portable copy of your medical information. If you have questions, please visit the Frequently Asked Questions section of the [email or web address] website. Remember, [email or web address] is NOT to be used for urgent needs. For medical emergencies, dial 911. Now available from your iPhone and Android! Please provide this summary of care documentation to your next provider. Your primary care clinician is listed as ROHITH ROD. If you have any questions after today's visit, please call [phone].

## 2017-06-20 NOTE — PROGRESS NOTES
Over the past 3 months, had 2 yeast infections. Today feels very itchy and burning on the labia. No shaving. No new sexual partners. Discomfort tends to be worse around the time of her period. Some tight underwear. Recent BG was normal.    Past Medical History, Surgical History, and Social History reviewed. Current Outpatient Prescriptions on File Prior to Visit   Medication Sig Dispense Refill    citalopram (CELEXA) 20 mg tablet Take 0.5 Tabs by mouth daily. 30 Tab 0     No current facility-administered medications on file prior to visit. No Known Allergies    ROS negative except as per HPI. Visit Vitals    /80 (BP 1 Location: Left arm, BP Patient Position: Sitting)    Pulse 82    Temp 98.2 °F (36.8 °C) (Oral)    Resp 18    Ht 5' 3\" (1.6 m)    Wt 203 lb (92.1 kg)    SpO2 97%    BMI 35.96 kg/m2     Gen: alert, oriented, no acute distress  Lungs: no increased respiratory effort, clear to ausculation bilaterally  Heart: regular rate and rhythm, no murmurs, rubs or gallops  Abdomen: soft, nontender, not distended. Normal bowel sounds. No rebound or guarding. :external genitalia with signs of irritation and excoriation,  vaginal vault with copious white discharge, normal cervix, normal on bimanual exam, nonpalpable ovaries. Assessment/Plan:    1. Candida vaginitis  Diflucan - getting ready to go to beach for a week, use in conjunction with topical cream.  If recurs, will need GYN. Medications Discontinued During This Encounter   Medication Reason    nystatin-triamcinolone (MYCOLOG) 100,000-0.1 unit/gram-% ointment Therapy Completed       Orders Placed This Encounter    fluconazole (DIFLUCAN) 150 mg tablet     Sig: Take 1 Tab by mouth every seven (7) days for 6 doses. FDA advises cautious prescribing of oral fluconazole in pregnancy.      Dispense:  6 Tab     Refill:  0       Current Outpatient Prescriptions   Medication Sig Dispense Refill    fluconazole (DIFLUCAN) 150 mg tablet Take 1 Tab by mouth every seven (7) days for 6 doses. FDA advises cautious prescribing of oral fluconazole in pregnancy. 6 Tab 0    citalopram (CELEXA) 20 mg tablet Take 0.5 Tabs by mouth daily. 30 Tab 0           Verbal and written instructions (see AVS) provided. Patient expresses understanding of diagnosis and treatment plan.

## 2017-08-14 DIAGNOSIS — F41.9 ANXIETY AND DEPRESSION: ICD-10-CM

## 2017-08-14 DIAGNOSIS — F32.A ANXIETY AND DEPRESSION: ICD-10-CM

## 2017-08-14 RX ORDER — CITALOPRAM 20 MG/1
10 TABLET, FILM COATED ORAL DAILY
Qty: 30 TAB | Refills: 0 | Status: SHIPPED | OUTPATIENT
Start: 2017-08-14 | End: 2017-08-29 | Stop reason: SDUPTHER

## 2017-08-14 NOTE — TELEPHONE ENCOUNTER
Pt has an appt for 08/29/17 with TIARA Chapman. She would like to know if she able to get enough Celexa to last until her appt date.  Please advise [phone]

## 2017-08-29 ENCOUNTER — OFFICE VISIT (OUTPATIENT)
Dept: FAMILY MEDICINE CLINIC | Age: 37
End: 2017-08-29

## 2017-08-29 VITALS
DIASTOLIC BLOOD PRESSURE: 89 MMHG | BODY MASS INDEX: 37 KG/M2 | OXYGEN SATURATION: 97 % | HEART RATE: 63 BPM | WEIGHT: 208.8 LBS | RESPIRATION RATE: 16 BRPM | SYSTOLIC BLOOD PRESSURE: 144 MMHG | TEMPERATURE: 98.1 F | HEIGHT: 63 IN

## 2017-08-29 DIAGNOSIS — F32.A ANXIETY AND DEPRESSION: ICD-10-CM

## 2017-08-29 DIAGNOSIS — F41.9 ANXIETY AND DEPRESSION: ICD-10-CM

## 2017-08-29 RX ORDER — CITALOPRAM 20 MG/1
20 TABLET, FILM COATED ORAL DAILY
Qty: 30 TAB | Refills: 6 | Status: SHIPPED | OUTPATIENT
Start: 2017-08-29 | End: 2018-08-09 | Stop reason: SDUPTHER

## 2017-08-29 NOTE — MR AVS SNAPSHOT
Visit Information Date & Time Provider Department Dept. Phone Encounter #  
 8/29/2017  9:00 AM [phone] [id number] Follow-up Instructions Return in about 6 months (around 2/28/2018), or if symptoms worsen or fail to improve. Upcoming Health Maintenance Date Due INFLUENZA AGE 9 TO ADULT 8/1/2017 PAP AKA CERVICAL CYTOLOGY 5/15/2020 DTaP/Tdap/Td series (2 - Td) 5/15/2027 Allergies as of 8/29/2017  Review Complete On: 8/29/2017 By: Gloria Arteaga NP No Known Allergies Current Immunizations  Never Reviewed Name Date Influenza Vaccine 10/30/2015, 10/17/2014, 11/26/2012, 11/5/2008, 11/2/2007 Influenza Vaccine (Quad) PF 10/17/2016 TB Skin Test (PPD) 8/7/2012 Not reviewed this visit You Were Diagnosed With   
  
 Codes Comments Anxiety and depression     ICD-10-CM: F41.9, F32.9 ICD-9-CM: 300.00, 311 Vitals BP Pulse Temp Resp Height(growth percentile) Weight(growth percentile) 144/89 (BP 1 Location: Left arm, BP Patient Position: Sitting) 63 98.1 °F (36.7 °C) (Oral) 16 5' 3\" (1.6 m) 208 lb 12.8 oz (94.7 kg) SpO2 BMI OB Status Smoking Status 97% 36.99 kg/m2 Having regular periods Current Some Day Smoker Vitals History BMI and BSA Data Body Mass Index Body Surface Area  
 36.99 kg/m 2 2.05 m 2 Preferred Pharmacy Pharmacy Name Phone [facility] [address] [phone] Your Updated Medication List  
  
   
This list is accurate as of: 8/29/17  9:54 AM.  Always use your most recent med list.  
  
  
  
  
 citalopram 20 mg tablet Commonly known as:  Ezella Batch Take 1 Tab by mouth daily. Prescriptions Sent to Pharmacy Refills  
 citalopram (CELEXA) 20 mg tablet 6 Sig: Take 1 Tab by mouth daily.   
 Class: Normal  
 Pharmacy: [facility] [address] [facility] #: [phone] Route: Oral  
  
Follow-up Instructions Return in about 6 months (around 2/28/2018), or if symptoms worsen or fail to improve. Patient Instructions Anxiety Disorder: Care Instructions Your Care Instructions Anxiety is a normal reaction to stress. Difficult situations can cause you to have symptoms such as sweaty palms and a nervous feeling. In an anxiety disorder, the symptoms are far more severe. Constant worry, muscle tension, trouble sleeping, nausea and diarrhea, and other symptoms can make normal daily activities difficult or impossible. These symptoms may occur for no reason, and they can affect your work, school, or social life. Medicines, counseling, and self-care can all help. Follow-up care is a key part of your treatment and safety. Be sure to make and go to all appointments, and call your doctor if you are having problems. It's also a good idea to know your test results and keep a list of the medicines you take. How can you care for yourself at home? · Take medicines exactly as directed. Call your doctor if you think you are having a problem with your medicine. · Go to your counseling sessions and follow-up appointments. · Recognize and accept your anxiety. Then, when you are in a situation that makes you anxious, say to yourself, \"This is not an emergency. I feel uncomfortable, but I am not in danger. I can keep going even if I feel anxious. \" · Be kind to your body: ¨ Relieve tension with exercise or a massage. ¨ Get enough rest. 
¨ Avoid alcohol, caffeine, nicotine, and illegal drugs. They can increase your anxiety level and cause sleep problems. ¨ Learn and do relaxation techniques. See below for more about these techniques. · Engage your mind. Get out and do something you enjoy. Go to a funny movie, or take a walk or hike. Plan your day.  Having too much or too little to do can make you anxious. · Keep a record of your symptoms. Discuss your fears with a good friend or family member, or join a support group for people with similar problems. Talking to others sometimes relieves stress. · Get involved in social groups, or volunteer to help others. Being alone sometimes makes things seem worse than they are. · Get at least 30 minutes of exercise on most days of the week to relieve stress. Walking is a good choice. You also may want to do other activities, such as running, swimming, cycling, or playing tennis or team sports. Relaxation techniques Do relaxation exercises 10 to 20 minutes a day. You can play soothing, relaxing music while you do them, if you wish. · Tell others in your house that you are going to do your relaxation exercises. Ask them not to disturb you. · Find a comfortable place, away from all distractions and noise. · Lie down on your back, or sit with your back straight. · Focus on your breathing. Make it slow and steady. · Breathe in through your nose. Breathe out through either your nose or mouth. · Breathe deeply, filling up the area between your navel and your rib cage. Breathe so that your belly goes up and down. · Do not hold your breath. · Breathe like this for 5 to 10 minutes. Notice the feeling of calmness throughout your whole body. As you continue to breathe slowly and deeply, relax by doing the following for another 5 to 10 minutes: · Tighten and relax each muscle group in your body. You can begin at your toes and work your way up to your head. · Imagine your muscle groups relaxing and becoming heavy. · Empty your mind of all thoughts. · Let yourself relax more and more deeply. · Become aware of the state of calmness that surrounds you.  
· When your relaxation time is over, you can bring yourself back to alertness by moving your fingers and toes and then your hands and feet and then stretching and moving your entire body. Sometimes people fall asleep during relaxation, but they usually wake up shortly afterward. · Always give yourself time to return to full alertness before you drive a car or do anything that might cause an accident if you are not fully alert. Never play a relaxation tape while you drive a car. When should you call for help? Call 911 anytime you think you may need emergency care. For example, call if: 
· You feel you cannot stop from hurting yourself or someone else. Keep the numbers for these national suicide hotlines: 5-372-449-TALK (2-163.906.1824) and 5-372-BPZYTIT (5-813.631.2801). If you or someone you know talks about suicide or feeling hopeless, get help right away. Watch closely for changes in your health, and be sure to contact your doctor if: 
· You have anxiety or fear that affects your life. · You have symptoms of anxiety that are new or different from those you had before. Where can you learn more? Go to http://sonia-sagrario.info/. Enter P754 in the search box to learn more about \"Anxiety Disorder: Care Instructions. \" Current as of: July 26, 2016 Content Version: 11.3 © 0275-6937 [employer]. Care instructions adapted under license by [employer] (which disclaims liability or warranty for this information). If you have questions about a medical condition or this instruction, always ask your healthcare professional. [place] [address] any warranty or liability for your use of this information. Introducing hospitals & HEALTH SERVICES! [facility] introduces [employer] patient portal. Now you can access parts of your medical record, email your doctor's office, and request medication refills online. 1. In your internet browser, go to [email or web address] "[email or web address]/[email or web address] 2. Click on the First Time User? Click Here link in the Sign In box. You will see the New Member Sign Up page. 3. Enter your Helios[employer] Access Code exactly as it appears below. You will not need to use this code after youve completed the sign-up process. If you do not sign up before the expiration date, you must request a new code. · [employer] Access Code: [email or web address] Expires: 11/27/2017  9:54 AM 
 
4. Enter the last four digits of your Social Security Number (xxxx) and Date of Birth (mm/dd/yyyy) as indicated and click Submit. You will be taken to the next sign-up page. 5. Create a Heliospectra ID. This will be your Helios[employer]ra login ID and cannot be changed, so think of one that is secure and easy to remember. 6. Create a Heliospectra password. You can change your password at any time. 7. Enter your Password Reset Question and Answer. This can be used at a later time if you forget your password. 8. Enter your e-mail address. You will receive e-mail notification when new information is available in [address] 9. Click Sign Up. You can now view and download portions of your medical record. 10. Click the Download Summary menu link to download a portable copy of your medical information. If you have questions, please visit the Frequently Asked Questions section of the Hel[employer]pect[employer] website. Remember, [employer] is NOT to be used for urgent needs. For medical emergencies, dial 911. Now available from your iPhone and Android! Please provide this summary of care documentation to your next provider. Your primary care clinician is listed as ROHITH ROD. If you have any questions after today's visit, please call [phone].

## 2017-08-29 NOTE — PROGRESS NOTES
Chief Complaint   Patient presents with    Medication Evaluation    Medication Refill     Patient needs refills for Celexa.    Health Maintenance reviewed

## 2017-08-29 NOTE — PATIENT INSTRUCTIONS

## 2017-08-29 NOTE — PROGRESS NOTES
Subjective:      Chief Complaint   Patient presents with    Medication Evaluation    Medication Refill       Mya Camilo is a 40 y.o. female who presents for follow up of of anxiety/depressive disorder. She has the following anxiety symptoms: feelings of losing control, difficulty concentrating and feeling emotional.  She denies current suicidal and homicidal ideation. Family history significant for anxiety and depression (mother). Had been on Celexa for about 12 years and felt that she did not need to be on it anymore so at last appointment we discussed tapering off to see how she would do off the medication. She says that she tapered down to being off the medication completely for about 1-2 months and says that she had been feeling \"very emotional and not able to handle daily life obstacles without feeling anxious and I would cry all the time over stupid stuff\". Never any SI/HI. She restarted the Celexa about 2-3 weeks ago and is taking 20mg daily and \"I feel great again. I just hate that I have to be dependant on medications but I did not like the way I was off of it\". Medical History:     Past Medical History:   Diagnosis Date    Anxiety and depression     Hypertension      Allergies:   No Known Allergies   Medications:     Current Outpatient Prescriptions   Medication Sig    citalopram (CELEXA) 20 mg tablet Take 1 Tab by mouth daily. No current facility-administered medications for this visit. Surgical History:   History reviewed. No pertinent surgical history.   Social History:     Social History     Social History    Marital status:      Spouse name: N/A    Number of children: N/A    Years of education: N/A     Social History Main Topics    Smoking status: Current Some Day Smoker     Packs/day: 0.25    Smokeless tobacco: Never Used      Comment: no vaping,     Alcohol use Yes    Drug use: No    Sexual activity: Yes     Other Topics Concern    None     Social History Narrative       Review of Systems  A comprehensive review of systems was negative except for that written in the HPI. Objective:     Visit Vitals    /89 (BP 1 Location: Left arm, BP Patient Position: Sitting)    Pulse 63    Temp 98.1 °F (36.7 °C) (Oral)    Resp 16    Ht 5' 3\" (1.6 m)    Wt 208 lb 12.8 oz (94.7 kg)    SpO2 97%    BMI 36.99 kg/m2        General:  alert, cooperative, no distress, appears stated age   Head:  NCAT w/o lesions or tenderness   Mouth:  Lips, mucosa, and tongue normal. Teeth and gums normal   Lungs: clear to auscultation bilaterally   Heart:  regular rate and rhythm, S1, S2 normal, no murmur, click, rub or gallop   Abdomen: soft, non-tender. Bowel sounds normal. No masses,  no organomegaly   Neuro:  normal without focal findings  mental status, speech normal, alert and oriented x iii  SHELL  reflexes normal and symmetric       Affect/Behavior:  good grooming, full facial expressions, normal speech pattern and content, normal thought patterns, normal perception, good insight, normal reasoning               Assessment/ Plan:       ICD-10-CM ICD-9-CM    1. Anxiety and depression F41.9 300.00 citalopram (CELEXA) 20 mg tablet    F32.9 311      Reviewed diet, exercise and weight control. Discussed BMI and healthy weight. Encouraged patient to work to implement changes including diet high in raw fruits and vegetables, lean protein and good fats. Limit refined, processed carbohydrates and sugar. Encouraged regular exercise. Reviewed medications and side effects in detail- refill given for 6 months on Celexa. Patient Education:  Reviewed concept of anxiety as biochemical imbalance of neurotransmitters and rationale for treatment. Instructed patient to contact office or on-call physician promptly should condition worsen or any new symptoms appear and provided on-call telephone numbers.   IF THE PATIENT HAS ANY SUICIDAL OR HOMICIDAL IDEATION, CALL THE OFFICE, DISCUSS WITH A SUPPORT MEMBER OR GO TO THE ER IMMEDIATELY- pt said they would. Will follow up in 6 months or sooner if needed. Verbal and written instructions (see AVS) provided. Patient expresses understanding and agreement of diagnosis and treatment plan.

## 2017-10-19 ENCOUNTER — OFFICE VISIT (OUTPATIENT)
Dept: FAMILY MEDICINE CLINIC | Age: 37
End: 2017-10-19

## 2017-10-19 VITALS
HEIGHT: 63 IN | TEMPERATURE: 98.3 F | BODY MASS INDEX: 38.27 KG/M2 | WEIGHT: 216 LBS | SYSTOLIC BLOOD PRESSURE: 141 MMHG | OXYGEN SATURATION: 98 % | HEART RATE: 77 BPM | RESPIRATION RATE: 12 BRPM | DIASTOLIC BLOOD PRESSURE: 81 MMHG

## 2017-10-19 DIAGNOSIS — J06.9 URI WITH COUGH AND CONGESTION: Primary | ICD-10-CM

## 2017-10-19 NOTE — PATIENT INSTRUCTIONS

## 2017-10-19 NOTE — PROGRESS NOTES
HPI  Emily Harris is a 40 y.o. female who presents with congestion, cough. Started 2 days ago. ok sleep, drinking fluids. Other symptoms include none. Denies fever, wheezing. dayquil and theraflu with releif    PMHx:  Past Medical History:   Diagnosis Date    Anxiety and depression     Hypertension        Meds:   Current Outpatient Prescriptions   Medication Sig Dispense Refill    citalopram (CELEXA) 20 mg tablet Take 1 Tab by mouth daily. 30 Tab 6       Allergies:   No Known Allergies    Smoker:  History   Smoking Status    Current Some Day Smoker    Packs/day: 0.25   Smokeless Tobacco    Never Used     Comment: no vaping,        ETOH:   History   Alcohol Use    Yes       FH:   Family History   Problem Relation Age of Onset    Cancer Maternal Grandfather         ROS:  As listed in HPI. In addition:  Constitutional:   No headache, fever, fatigue, weight loss or weight gain      Eyes:   No redness, pruritis, pain, visual changes, swelling, or discharge      Ears:    No pain, loss or changes in hearing     Cardiac:    No chest pain      Resp:   No shortness of breath or wheeze     Neuro   No loss of consciousness, dizziness, seizure    Physical Exam:  Blood pressure (!) 155/114, pulse 77, temperature 98.3 °F (36.8 °C), resp. rate 12, height 5' 3\" (1.6 m), weight 216 lb (98 kg), SpO2 98 %. GEN: No apparent distress. EYES:  Conjunctiva clear  EAR: TM are clear and without effusion. NOSE: Turbinates are congested  OROPHYARYNX: No erythema or tonsilar exudates  NECK:  Submandibular LAD. Non tender         LUNGS: Respirations unlabored; clear to auscultation bilaterally. No wheeze  CARDIOVASCULAR: Regular, rate, and rhythm  ABDOMEN: Soft; nontender;nl BS  SKIN: No obvious rashes.        Assessment and Plan     Viral URI  Expect ssx to last 10-14 days  Supportive care is best, provided a handout on available OTC remedies  Nasal steroid OTC for congestion  Honey in warm water for cough    RTC if ssx worsen or fail to improve as expected      ICD-10-CM ICD-9-CM    1. URI with cough and congestion J06.9 465.9        AVS given.  Pt expressed understanding of instructions

## 2017-10-19 NOTE — MR AVS SNAPSHOT
Visit Information Date & Time Provider Department Dept. Phone Encounter #  
 10/19/2017  8:20 AM Leobardo Lawrence MD [address] [facility] [phone] [id number] Upcoming Health Maintenance Date Due INFLUENZA AGE 9 TO ADULT 8/1/2017 PAP AKA CERVICAL CYTOLOGY 5/15/2020 DTaP/Tdap/Td series (2 - Td) 5/15/2027 Allergies as of 10/19/2017  Review Complete On: 10/19/2017 By: Leobardo Lawrence MD  
 No Known Allergies Current Immunizations  Never Reviewed Name Date Influenza Vaccine 10/30/2015, 10/17/2014, 11/26/2012, 11/5/2008, 11/2/2007 Influenza Vaccine (Quad) PF 10/17/2016 TB Skin Test (PPD) 8/7/2012 Not reviewed this visit You Were Diagnosed With   
  
 Codes Comments URI with cough and congestion    -  Primary ICD-10-CM: J06.9 ICD-9-CM: 465.9 Vitals BP Pulse Temp Resp Height(growth percentile) Weight(growth percentile) (!) 155/114 (BP 1 Location: Left arm, BP Patient Position: Sitting) 77 98.3 °F (36.8 °C) 12 5' 3\" (1.6 m) 216 lb (98 kg) SpO2 BMI OB Status Smoking Status 98% 38.26 kg/m2 Having regular periods Current Some Day Smoker BMI and BSA Data Body Mass Index Body Surface Area  
 38.26 kg/m 2 2.09 m 2 Preferred Pharmacy Pharmacy Name Phone [facility] [address] [phone] Your Updated Medication List  
  
   
This list is accurate as of: 10/19/17  8:38 AM.  Always use your most recent med list.  
  
  
  
  
 citalopram 20 mg tablet Commonly known as:  Saba Oden Take 1 Tab by mouth daily. Patient Instructions Upper Respiratory Infection (Cold): Care Instructions Your Care Instructions An upper respiratory infection, or URI, is an infection of the nose, sinuses, or throat. URIs are spread by coughs, sneezes, and direct contact. The common cold is the most frequent kind of URI.  The flu and sinus infections are other kinds of URIs. Almost all URIs are caused by viruses. Antibiotics won't cure them. But you can treat most infections with home care. This may include drinking lots of fluids and taking over-the-counter pain medicine. You will probably feel better in 4 to 10 days. The doctor has checked you carefully, but problems can develop later. If you notice any problems or new symptoms, get medical treatment right away. Follow-up care is a key part of your treatment and safety. Be sure to make and go to all appointments, and call your doctor if you are having problems. It's also a good idea to know your test results and keep a list of the medicines you take. How can you care for yourself at home? · To prevent dehydration, drink plenty of fluids, enough so that your urine is light yellow or clear like water. Choose water and other caffeine-free clear liquids until you feel better. If you have kidney, heart, or liver disease and have to limit fluids, talk with your doctor before you increase the amount of fluids you drink. · Take an over-the-counter pain medicine, such as acetaminophen (Tylenol), ibuprofen (Advil, Motrin), or naproxen (Aleve). Read and follow all instructions on the label. · Before you use cough and cold medicines, check the label. These medicines may not be safe for young children or for people with certain health problems. · Be careful when taking over-the-counter cold or flu medicines and Tylenol at the same time. Many of these medicines have acetaminophen, which is Tylenol. Read the labels to make sure that you are not taking more than the recommended dose. Too much acetaminophen (Tylenol) can be harmful. · Get plenty of rest. 
· Do not smoke or allow others to smoke around you. If you need help quitting, talk to your doctor about stop-smoking programs and medicines. These can increase your chances of quitting for good. When should you call for help? Call 911 anytime you think you may need emergency care. For example, call if: 
· You have severe trouble breathing. Call your doctor now or seek immediate medical care if: 
· You seem to be getting much sicker. · You have new or worse trouble breathing. · You have a new or higher fever. · You have a new rash. Watch closely for changes in your health, and be sure to contact your doctor if: 
· You have a new symptom, such as a sore throat, an earache, or sinus pain. · You cough more deeply or more often, especially if you notice more mucus or a change in the color of your mucus. · You do not get better as expected. Where can you learn more? Go to http://sonia-sagrario.info/. Enter Y214 in the search box to learn more about \"Upper Respiratory Infection (Cold): Care Instructions. \" Current as of: March 25, 2017 Content Version: 11.3 © 2011-2167 [employer]. Care instructions adapted under license by [employer] (which disclaims liability or warranty for this information). If you have questions about a medical condition or this instruction, always ask your healthcare professional. [place] [address] any warranty or liability for your use of this information. Introducing [facility]! Lucia Johnson introduces [employer] patient portal. Now you can access parts of your medical record, email your doctor's office, and request medication refills online. 1. In your internet browser, go to [email or web address] 2. Click on the First Time User? Click Here link in the Sign In box. You will see the New Member Sign Up page. 3. Enter your [employer] Access Code exactly as it appears below. You will not need to use this code after youve completed the sign-up process. If you do not sign up before the expiration date, you must request a new code. · [employer] Access Code: [email or web address] Expires: 11/27/2017  9:54 AM 
 
 4. Enter the last four digits of your Social Security Number (xxxx) and Date of Birth (mm/dd/yyyy) as indicated and click Submit. You will be taken to the next sign-up page. 5. Create a kWhOURS ID. This will be your [employer] login ID and cannot be changed, so think of one that is secure and easy to remember. 6. Create a [employer] password. You can change your password at any time. 7. Enter your Password Reset Question and Answer. This can be used at a later time if you forget your password. 8. Enter your e-mail address. You will receive e-mail notification when new information is available in [address] 9. Click Sign Up. You can now view and download portions of your medical record. 10. Click the Download Summary menu link to download a portable copy of your medical information. If you have questions, please visit the Frequently Asked Questions section of the [employer] website. Remember, [employer] is NOT to be used for urgent needs. For medical emergencies, dial 911. Now available from your iPhone and Android! Please provide this summary of care documentation to your next provider. Your primary care clinician is listed as ROHITH ROD. If you have any questions after today's visit, please call [phone].

## 2017-10-27 ENCOUNTER — CLINICAL SUPPORT (OUTPATIENT)
Dept: FAMILY MEDICINE CLINIC | Age: 37
End: 2017-10-27

## 2017-10-27 DIAGNOSIS — Z23 ENCOUNTER FOR IMMUNIZATION: Primary | ICD-10-CM

## 2017-10-27 NOTE — PATIENT INSTRUCTIONS
Vaccine Information Statement    Influenza (Flu) Vaccine (Inactivated or Recombinant): What you need to know    Many Vaccine Information Statements are available in [place] and other languages. See www.immunize.org/vis  Hojas de Información Sobre Vacunas están disponibles en Español y en muchos otros idiomas. Visite www.immunize.org/vis    1. Why get vaccinated? Influenza (flu) is a contagious disease that spreads around the [place] every year, usually between October and May. Flu is caused by influenza viruses, and is spread mainly by coughing, sneezing, and close contact. Anyone can get flu. Flu strikes suddenly and can last several days. Symptoms vary by age, but can include:   fever/chills   sore throat   muscle aches   fatigue   cough   headache    runny or stuffy nose    Flu can also lead to pneumonia and blood infections, and cause diarrhea and seizures in children. If you have a medical condition, such as heart or lung disease, flu can make it worse. Flu is more dangerous for some people. Infants and young children, people 72years of age and older, pregnant women, and people with certain health conditions or a weakened immune system are at greatest risk. Each year thousands of people in the [facility] die from flu, and many more are hospitalized. Flu vaccine can:   keep you from getting flu,   make flu less severe if you do get it, and   keep you from spreading flu to your family and other people. 2. Inactivated and recombinant flu vaccines    A dose of flu vaccine is recommended every flu season. Children 6 months through 6years of age may need two doses during the same flu season. Everyone else needs only one dose each flu season.        Some inactivated flu vaccines contain a very small amount of a mercury-based preservative called thimerosal. Studies have not shown thimerosal in vaccines to be harmful, but flu vaccines that do not contain thimerosal are available. There is no live flu virus in flu shots. They cannot cause the flu. There are many flu viruses, and they are always changing. Each year a new flu vaccine is made to protect against three or four viruses that are likely to cause disease in the upcoming flu season. But even when the vaccine doesnt exactly match these viruses, it may still provide some protection    Flu vaccine cannot prevent:   flu that is caused by a virus not covered by the vaccine, or   illnesses that look like flu but are not. It takes about 2 weeks for protection to develop after vaccination, and protection lasts through the flu season. 3. Some people should not get this vaccine    Tell the person who is giving you the vaccine:     If you have any severe, life-threatening allergies. If you ever had a life-threatening allergic reaction after a dose of flu vaccine, or have a severe allergy to any part of this vaccine, you may be advised not to get vaccinated. Most, but not all, types of flu vaccine contain a small amount of egg protein.  If you ever had Guillain-Barré Syndrome (also called GBS). Some people with a history of GBS should not get this vaccine. This should be discussed with your doctor.  If you are not feeling well. It is usually okay to get flu vaccine when you have a mild illness, but you might be asked to come back when you feel better. 4. Risks of a vaccine reaction    With any medicine, including vaccines, there is a chance of reactions. These are usually mild and go away on their own, but serious reactions are also possible. Most people who get a flu shot do not have any problems with it.      Minor problems following a flu shot include:    soreness, redness, or swelling where the shot was given     hoarseness   sore, red or itchy eyes   cough   fever   aches   headache   itching   fatigue  If these problems occur, they usually begin soon after the shot and last 1 or 2 days. More serious problems following a flu shot can include the following:     There may be a small increased risk of Guillain-Barré Syndrome (GBS) after inactivated flu vaccine. This risk has been estimated at 1 or 2 additional cases per million people vaccinated. This is much lower than the risk of severe complications from flu, which can be prevented by flu vaccine.  Young children who get the flu shot along with pneumococcal vaccine (PCV13) and/or DTaP vaccine at the same time might be slightly more likely to have a seizure caused by fever. Ask your doctor for more information. Tell your doctor if a child who is getting flu vaccine has ever had a seizure. Problems that could happen after any injected vaccine:      People sometimes faint after a medical procedure, including vaccination. Sitting or lying down for about 15 minutes can help prevent fainting, and injuries caused by a fall. Tell your doctor if you feel dizzy, or have vision changes or ringing in the ears.  Some people get severe pain in the shoulder and have difficulty moving the arm where a shot was given. This happens very rarely.  Any medication can cause a severe allergic reaction. Such reactions from a vaccine are very rare, estimated at about 1 in a million doses, and would happen within a few minutes to a few hours after the vaccination. As with any medicine, there is a very remote chance of a vaccine causing a serious injury or death. The safety of vaccines is always being monitored. For more information, visit: www.cdc.gov/vaccinesafety/    5. What if there is a serious reaction? What should I look for?  Look for anything that concerns you, such as signs of a severe allergic reaction, very high fever, or unusual behavior.     Signs of a severe allergic reaction can include hives, swelling of the face and throat, difficulty breathing, a fast heartbeat, dizziness, and weakness - usually within a few minutes to a few hours after the vaccination. What should I do?  If you think it is a severe allergic reaction or other emergency that cant wait, call 9-1-1 and get the person to the nearest hospital. Otherwise, call your doctor.  Reactions should be reported to the Vaccine Adverse Event Reporting System (VAERS). Your doctor should file this report, or you can do it yourself through  the VAERS web site at www.vaers. [facility].gov, or by calling 4-146.972.9728. VAERS does not give medical advice. 6. The National Vaccine Injury Compensation Program    The [facility] Vaccine Injury Compensation Program (VICP) is a federal program that was created to compensate people who may have been injured by certain vaccines. Persons who believe they may have been injured by a vaccine can learn about the program and about filing a claim by calling 6-217.181.8185 or visiting the [employer] website at www.[facility].gov/vaccinecompensation. There is a time limit to file a claim for compensation. 7. How can I learn more?  Ask your healthcare provider. He or she can give you the vaccine package insert or suggest other sources of information.  Call your local or state health department.  Contact the Centers for Disease Control and Prevention (CDC):  - Call 7-842.974.9302 (1-800-CDC-INFO) or  - Visit CDCs website at www.cdc.gov/flu    Vaccine Information Statement   Inactivated Influenza Vaccine   8/7/2015  42 MILANA Shepherd [id number]    Department of Health and Human Services  Centers for Disease Control and Prevention    Office Use Only

## 2017-10-27 NOTE — MR AVS SNAPSHOT
Visit Information Date & Time Provider Department Dept. Phone Encounter #  
 10/27/2017  2:40 PM Halima Romero [place] [address] [phone] [id number] Upcoming Health Maintenance Date Due INFLUENZA AGE 9 TO ADULT 8/1/2017 PAP AKA CERVICAL CYTOLOGY 5/15/2020 DTaP/Tdap/Td series (2 - Td) 5/15/2027 Allergies as of 10/27/2017  Review Complete On: 10/19/2017 By: Maria R Chance MD  
 No Known Allergies Current Immunizations  Never Reviewed Name Date Influenza Vaccine 10/30/2015, 10/17/2014, 11/26/2012, 11/5/2008, 11/2/2007 Influenza Vaccine (Quad) PF  Incomplete, 10/17/2016 TB Skin Test (PPD) 8/7/2012 Not reviewed this visit You Were Diagnosed With   
  
 Codes Comments Encounter for immunization    -  Primary ICD-10-CM: X24 ICD-9-CM: V03.89 Vitals OB Status Smoking Status Having regular periods Current Some Day Smoker Preferred Pharmacy Pharmacy Name Phone [facility] [phone] Your Updated Medication List  
  
   
This list is accurate as of: 10/27/17  2:45 PM.  Always use your most recent med list.  
  
  
  
  
 citalopram 20 mg tablet Commonly known as:  Jhonathanelyletitia Divers Take 1 Tab by mouth daily. We Performed the Following INFLUENZA VIRUS VAC QUAD,SPLIT,PRESV FREE SYRINGE IM [id number] CPT(R)] MD IMMUNIZ ADMIN,1 SINGLE/COMB VAC/TOXOID [id number] CPT(R)] Patient Instructions Vaccine Information Statement Influenza (Flu) Vaccine (Inactivated or Recombinant): What you need to know Many Vaccine Information Statements are available in Turkmen and other languages. See www.immunize.org/vis Hojas de Información Sobre Vacunas están disponibles en Español y en muchos otros idiomas. Visite www.immunize.org/vis 1. Why get vaccinated?  
 
Influenza (flu) is a contagious disease that spreads around the [facility] every year, usually between October and May. Flu is caused by influenza viruses, and is spread mainly by coughing, sneezing, and close contact. Anyone can get flu. Flu strikes suddenly and can last several days. Symptoms vary by age, but can include: 
 fever/chills  sore throat  muscle aches  fatigue  cough  headache  runny or stuffy nose Flu can also lead to pneumonia and blood infections, and cause diarrhea and seizures in children. If you have a medical condition, such as heart or lung disease, flu can make it worse. Flu is more dangerous for some people. Infants and young children, people 72years of age and older, pregnant women, and people with certain health conditions or a weakened immune system are at greatest risk. Each year thousands of people in the [facility] die from flu, and many more are hospitalized. Flu vaccine can: 
 keep you from getting flu, 
 make flu less severe if you do get it, and 
 keep you from spreading flu to your family and other people. 2. Inactivated and recombinant flu vaccines A dose of flu vaccine is recommended every flu season. Children 6 months through 6years of age may need two doses during the same flu season. Everyone else needs only one dose each flu season. Some inactivated flu vaccines contain a very small amount of a mercury-based preservative called thimerosal. Studies have not shown thimerosal in vaccines to be harmful, but flu vaccines that do not contain thimerosal are available. There is no live flu virus in flu shots. They cannot cause the flu. There are many flu viruses, and they are always changing. Each year a new flu vaccine is made to protect against three or four viruses that are likely to cause disease in the upcoming flu season. But even when the vaccine doesnt exactly match these viruses, it may still provide some protection Flu vaccine cannot prevent:  flu that is caused by a virus not covered by the vaccine, or 
 illnesses that look like flu but are not. It takes about 2 weeks for protection to develop after vaccination, and protection lasts through the flu season. 3. Some people should not get this vaccine Tell the person who is giving you the vaccine:  If you have any severe, life-threatening allergies. If you ever had a life-threatening allergic reaction after a dose of flu vaccine, or have a severe allergy to any part of this vaccine, you may be advised not to get vaccinated. Most, but not all, types of flu vaccine contain a small amount of egg protein.  If you ever had Guillain-Barré Syndrome (also called GBS). Some people with a history of GBS should not get this vaccine. This should be discussed with your doctor.  If you are not feeling well. It is usually okay to get flu vaccine when you have a mild illness, but you might be asked to come back when you feel better. 4. Risks of a vaccine reaction With any medicine, including vaccines, there is a chance of reactions. These are usually mild and go away on their own, but serious reactions are also possible. Most people who get a flu shot do not have any problems with it. Minor problems following a flu shot include:  
 soreness, redness, or swelling where the shot was given  hoarseness  sore, red or itchy eyes  cough  fever  aches  headache  itching  fatigue If these problems occur, they usually begin soon after the shot and last 1 or 2 days. More serious problems following a flu shot can include the following:  There may be a small increased risk of Guillain-Barré Syndrome (GBS) after inactivated flu vaccine. This risk has been estimated at 1 or 2 additional cases per million people vaccinated. This is much lower than the risk of severe complications from flu, which can be prevented by flu vaccine.  Young children who get the flu shot along with pneumococcal vaccine (PCV13) and/or DTaP vaccine at the same time might be slightly more likely to have a seizure caused by fever. Ask your doctor for more information. Tell your doctor if a child who is getting flu vaccine has ever had a seizure. Problems that could happen after any injected vaccine:  People sometimes faint after a medical procedure, including vaccination. Sitting or lying down for about 15 minutes can help prevent fainting, and injuries caused by a fall. Tell your doctor if you feel dizzy, or have vision changes or ringing in the ears.  Some people get severe pain in the shoulder and have difficulty moving the arm where a shot was given. This happens very rarely.  Any medication can cause a severe allergic reaction. Such reactions from a vaccine are very rare, estimated at about 1 in a million doses, and would happen within a few minutes to a few hours after the vaccination. As with any medicine, there is a very remote chance of a vaccine causing a serious injury or death. The safety of vaccines is always being monitored. For more information, visit: www.cdc.gov/vaccinesafety/ 
 
5. What if there is a serious reaction? What should I look for?  Look for anything that concerns you, such as signs of a severe allergic reaction, very high fever, or unusual behavior. Signs of a severe allergic reaction can include hives, swelling of the face and throat, difficulty breathing, a fast heartbeat, dizziness, and weakness  usually within a few minutes to a few hours after the vaccination. What should I do?  If you think it is a severe allergic reaction or other emergency that cant wait, call 9-1-1 and get the person to the nearest hospital. Otherwise, call your doctor.  Reactions should be reported to the Vaccine Adverse Event Reporting System (VAERS).  Your doctor should file this report, or you can do it yourself through  the VAERS web site at www.vaers. [facility].gov, or by calling 2-670.187.3705. VAERS does not give medical advice. 6. The National Vaccine Injury Compensation Program 
 
The [facility] Vaccine Injury Compensation Program (VICP) is a federal program that was created to compensate people who may have been injured by certain vaccines. Persons who believe they may have been injured by a vaccine can learn about the program and about filing a claim by calling 8-340.272.3639 or visiting the [address] [place], [place] [address] website at www.[facility].gov/vaccinecompensation. There is a time limit to file a claim for compensation. 7. How can I learn more?  Ask your healthcare provider. He or she can give you the vaccine package insert or suggest other sources of information.  Call your local or state health department.  Contact the Centers for Disease Control and Prevention (CDC): 
- Call 2-964.257.6163 (5-654-MXE-INFO) or 
- Visit CDCs website at www.cdc.gov/flu Vaccine Information Statement Inactivated Influenza Vaccine 8/7/2015 
42 MILANA Anderson [id number] Department of [facility] and [employer] Centers for Disease Control and Prevention Office Use Only Introducing [facility]! [facility] introduces FlowPlay patient portal. Now you can access parts of your medical record, email your doctor's office, and request medication refills online. 1. In your internet browser, go to [email or web address] timeadst 2. Click on the First Time User? Click Here link in the Sign In box. You will see the New Member Sign Up page. 3. Enter your FlowPlay Access Code exactly as it appears below. You will not need to use this code after youve completed the sign-up process. If you do not sign up before the expiration date, you must request a new code. · FlowPlay Access Code: [email or web address] Expires: 11/27/2017  9:54 AM 
 
4.  Enter the last four digits of your Social Security Number (xxxx) and Date of Birth (mm/dd/yyyy) as indicated and click Submit. You will be taken to the next sign-up page. 5. Create a Nanoogo ID. This will be your Nanoogo login ID and cannot be changed, so think of one that is secure and easy to remember. 6. Create a Nanoogo password. You can change your password at any time. 7. Enter your Password Reset Question and Answer. This can be used at a later time if you forget your password. 8. Enter your e-mail address. You will receive e-mail notification when new information is available in [address] 9. Click Sign Up. You can now view and download portions of your medical record. 10. Click the Download Summary menu link to download a portable copy of your medical information. If you have questions, please visit the Frequently Asked Questions section of the Nanoogo website. Remember, Nanoogo is NOT to be used for urgent needs. For medical emergencies, dial 911. Now available from your iPhone and Android! Please provide this summary of care documentation to your next provider. Your primary care clinician is listed as ROHITH ROD. If you have any questions after today's visit, please call [phone].

## 2017-10-27 NOTE — PROGRESS NOTES
Sugar Thomas is a 40 y.o. female who presents for routine immunizations. She denies any symptoms , reactions or allergies that would exclude them from being immunized today. Risks and adverse reactions were discussed and the VIS was given to them. All questions were addressed. She was observed for 10 min post injection. There were no reactions observed.     Rylie Abdalla LPN

## 2018-01-15 ENCOUNTER — OFFICE VISIT (OUTPATIENT)
Dept: FAMILY MEDICINE CLINIC | Age: 38
End: 2018-01-15

## 2018-01-15 VITALS
DIASTOLIC BLOOD PRESSURE: 82 MMHG | TEMPERATURE: 98.7 F | WEIGHT: 217 LBS | BODY MASS INDEX: 38.45 KG/M2 | HEART RATE: 80 BPM | RESPIRATION RATE: 16 BRPM | HEIGHT: 63 IN | OXYGEN SATURATION: 98 % | SYSTOLIC BLOOD PRESSURE: 125 MMHG

## 2018-01-15 DIAGNOSIS — B37.31 VAGINAL CANDIDIASIS: Primary | ICD-10-CM

## 2018-01-15 RX ORDER — FLUCONAZOLE 150 MG/1
150 TABLET ORAL
Qty: 3 TAB | Refills: 0 | Status: SHIPPED | OUTPATIENT
Start: 2018-01-15 | End: 2018-01-30

## 2018-01-15 NOTE — PROGRESS NOTES
Subjective:      Patient : This patient is a 40 y.o. female that presents today with a chief complaint of vaginal itching. Thick white vaginal discharge. No dysuria. No back pain or suprapubic tenderness. Always feels itchy. No chance of pregnancy. Tried OTC cream without relief. Past Medical History:   Diagnosis Date    Anxiety and depression     Hypertension      Current Outpatient Prescriptions   Medication Sig    citalopram (CELEXA) 20 mg tablet Take 1 Tab by mouth daily. No current facility-administered medications for this visit. No Known Allergies  Social History   Substance Use Topics    Smoking status: Current Some Day Smoker     Packs/day: 0.25    Smokeless tobacco: Never Used      Comment: no vaping,     Alcohol use Yes       ROS per HPI. Objective:     Visit Vitals    /82 (BP 1 Location: Right arm, BP Patient Position: Sitting)    Pulse 80    Temp 98.7 °F (37.1 °C) (Oral)    Resp 16    Ht 5' 3\" (1.6 m)    Wt 217 lb (98.4 kg)    LMP 12/29/2017    SpO2 98%    BMI 38.44 kg/m2       Skin: no pallor, normal turgor  Heart: regular rate and rhythm, no murmurs, rubs or gallops  Lungs: no increased respiratory effort, clear to ausculation bilaterally  Abdomen: soft, nontender, not distended. Normal bowel sounds. No rebound or guarding. Back: no costovertebral angle tenderness  Extremities:no edema or cyanosis  Neuro awake and oriented        Assessment:       ICD-10-CM ICD-9-CM    1. Vaginal candidiasis B37.3 112.1      Recurrent, discussed plan for decreasing risk of recurrence    Plan:     Orders Placed This Encounter    fluconazole (DIFLUCAN) 150 mg tablet     Sig: Take 1 Tab by mouth every seven (7) days for 3 doses. FDA advises cautious prescribing of oral fluconazole in pregnancy. Dispense:  3 Tab     Refill:  0       Verbal and written instructions (see AVS) provided. Patient expresses understanding of diagnosis and treatment plan.

## 2018-01-15 NOTE — MR AVS SNAPSHOT
Visit Information Date & Time Provider Department Dept. Phone Encounter #  
 1/15/2018 11:30 AM [phone] [id number] Upcoming Health Maintenance Date Due  
 PAP AKA CERVICAL CYTOLOGY 5/15/2020 DTaP/Tdap/Td series (2 - Td) 5/15/2027 Allergies as of 1/15/2018  Review Complete On: 1/15/2018 By: Rubens Hendrix MD  
 No Known Allergies Current Immunizations  Reviewed on 1/15/2018 Name Date Influenza Vaccine 10/30/2015, 10/17/2014, 11/26/2012, 11/5/2008, 11/2/2007 Influenza Vaccine (Quad) PF 10/27/2017, 10/17/2016 TB Skin Test (PPD) 8/7/2012 Reviewed by Chiquita Bateman LPN on 4/51/6136 at 35:75 AM  
You Were Diagnosed With   
  
 Codes Comments Vaginal candidiasis    -  Primary ICD-10-CM: B37.3 ICD-9-CM: 112.1 Vitals BP Pulse Temp Resp Height(growth percentile) Weight(growth percentile) 125/82 (BP 1 Location: Right arm, BP Patient Position: Sitting) 80 98.7 °F (37.1 °C) (Oral) 16 5' 3\" (1.6 m) 217 lb (98.4 kg) LMP SpO2 BMI OB Status Smoking Status 12/29/2017 98% 38.44 kg/m2 Having regular periods Current Some Day Smoker Vitals History BMI and BSA Data Body Mass Index Body Surface Area  
 38.44 kg/m 2 2.09 m 2 Preferred Pharmacy Pharmacy Name Phone [facility], [address] [phone] Your Updated Medication List  
  
   
This list is accurate as of: 1/15/18 12:14 PM.  Always use your most recent med list.  
  
  
  
  
 citalopram 20 mg tablet Commonly known as:  Luis Armando Bohr Take 1 Tab by mouth daily. fluconazole 150 mg tablet Commonly known as:  DIFLUCAN Take 1 Tab by mouth every seven (7) days for 3 doses. FDA advises cautious prescribing of oral fluconazole in pregnancy. Prescriptions Sent to Pharmacy  Refills  
 fluconazole (DIFLUCAN) 150 mg tablet 0  
 Sig: Take 1 Tab by mouth every seven (7) days for 3 doses. FDA advises cautious prescribing of oral fluconazole in pregnancy. Class: Normal  
 Pharmacy: [facility] [facility] #: [phone] Route: Oral  
  
Patient Instructions Vaginal Yeast Infection: Care Instructions Your Care Instructions A vaginal yeast infection is caused by too many yeast cells in the vagina. This is common in women of all ages. Itching, vaginal discharge and irritation, and other symptoms can bother you. But yeast infections don't often cause other health problems. Some medicines can increase your risk of getting a yeast infection. These include antibiotics, birth control pills, hormones, and steroids. You may also be more likely to get a yeast infection if you are pregnant, have diabetes, douche, or wear tight clothes. With treatment, most yeast infections get better in 2 to 3 days. Follow-up care is a key part of your treatment and safety. Be sure to make and go to all appointments, and call your doctor if you are having problems. It's also a good idea to know your test results and keep a list of the medicines you take. How can you care for yourself at home? · Take your medicines exactly as prescribed. Call your doctor if you think you are having a problem with your medicine. · Ask your doctor about over-the-counter (OTC) medicines for yeast infections. They may cost less than prescription medicines. If you use an OTC treatment, read and follow all instructions on the label. · Do not use tampons while using a vaginal cream or suppository. The tampons can absorb the medicine. Use pads instead. · Wear loose cotton clothing. Do not wear nylon or other fabric that holds body heat and moisture close to the skin. · Try sleeping without underwear. · Do not scratch. Relieve itching with a cold pack or a cool bath. · Do not wash your vaginal area more than once a day. Use plain water or a mild, unscented soap. Air-dry the vaginal area. · Change out of wet swimsuits after swimming. · Do not have sex until you have finished your treatment. · Do not douche. When should you call for help? Call your doctor now or seek immediate medical care if: 
? · You have unexpected vaginal bleeding. ? · You have new or increased pain in your vagina or pelvis. ? Watch closely for changes in your health, and be sure to contact your doctor if: 
? · You have a fever. ? · You are not getting better after 2 days. ? · Your symptoms come back after you finish your medicines. Where can you learn more? Go to http://sonia-sagrario.info/. Enter Y882 in the search box to learn more about \"Vaginal Yeast Infection: Care Instructions. \" Current as of: October 13, 2016 Content Version: 11.4 © 6865-7119 GoGuide. Care instructions adapted under license by [employer] (which disclaims liability or warranty for this information). If you have questions about a medical condition or this instruction, always ask your healthcare professional. [address] any warranty or liability for your use of this information. Introducing [facility]! Jose Forde introduces TrueSpan patient portal. Now you can access parts of your medical record, email your doctor's office, and request medication refills online. 1. In your internet browser, go to [email or web address] 2. Click on the First Time User? Click Here link in the Sign In box. You will see the New Member Sign Up page. 3. Enter your TrueSpan Access Code exactly as it appears below. You will not need to use this code after youve completed the sign-up process. If you do not sign up before the expiration date, you must request a new code.  
 
· TrueSpan Access Code: [email or web address] 
 Expires: 4/15/2018 12:11 PM 
 
4. Enter the last four digits of your Social Security Number (xxxx) and Date of Birth (mm/dd/yyyy) as indicated and click Submit. You will be taken to the next sign-up page. 5. Create a Particle ID. This will be your Particle login ID and cannot be changed, so think of one that is secure and easy to remember. 6. Create a Particle password. You can change your password at any time. 7. Enter your Password Reset Question and Answer. This can be used at a later time if you forget your password. 8. Enter your e-mail address. You will receive e-mail notification when new information is available in [address] 9. Click Sign Up. You can now view and download portions of your medical record. 10. Click the Download Summary menu link to download a portable copy of your medical information. If you have questions, please visit the Frequently Asked Questions section of the Particle website. Remember, Particle is NOT to be used for urgent needs. For medical emergencies, dial 911. Now available from your iPhone and Android! Please provide this summary of care documentation to your next provider. Your primary care clinician is listed as ROHITH ROD. If you have any questions after today's visit, please call [phone].

## 2018-01-15 NOTE — PROGRESS NOTES
Esther Soriano is a 40 y.o. female    Chief Complaint   Patient presents with    Yeast Infection       Visit Vitals    /82 (BP 1 Location: Right arm, BP Patient Position: Sitting)    Pulse 80    Temp 98.7 °F (37.1 °C) (Oral)    Resp 16    Ht 5' 3\" (1.6 m)    Wt 217 lb (98.4 kg)    SpO2 98%    BMI 38.44 kg/m2     1. Have you been to the ER, urgent care clinic since your last visit? Hospitalized since your last visit?no  2. Have you seen or consulted any other health care providers outside of the [address] [place] [address] since your last visit? Include any pap smears or colon screening.  No

## 2018-01-15 NOTE — PATIENT INSTRUCTIONS
Vaginal Yeast Infection: Care Instructions  Your Care Instructions    A vaginal yeast infection is caused by too many yeast cells in the vagina. This is common in women of all ages. Itching, vaginal discharge and irritation, and other symptoms can bother you. But yeast infections don't often cause other health problems. Some medicines can increase your risk of getting a yeast infection. These include antibiotics, birth control pills, hormones, and steroids. You may also be more likely to get a yeast infection if you are pregnant, have diabetes, douche, or wear tight clothes. With treatment, most yeast infections get better in 2 to 3 days. Follow-up care is a key part of your treatment and safety. Be sure to make and go to all appointments, and call your doctor if you are having problems. It's also a good idea to know your test results and keep a list of the medicines you take. How can you care for yourself at home? · Take your medicines exactly as prescribed. Call your doctor if you think you are having a problem with your medicine. · Ask your doctor about over-the-counter (OTC) medicines for yeast infections. They may cost less than prescription medicines. If you use an OTC treatment, read and follow all instructions on the label. · Do not use tampons while using a vaginal cream or suppository. The tampons can absorb the medicine. Use pads instead. · Wear loose cotton clothing. Do not wear nylon or other fabric that holds body heat and moisture close to the skin. · Try sleeping without underwear. · Do not scratch. Relieve itching with a cold pack or a cool bath. · Do not wash your vaginal area more than once a day. Use plain water or a mild, unscented soap. Air-dry the vaginal area. · Change out of wet swimsuits after swimming. · Do not have sex until you have finished your treatment. · Do not douche. When should you call for help?   Call your doctor now or seek immediate medical care if:  ? · You have unexpected vaginal bleeding. ? · You have new or increased pain in your vagina or pelvis. ? Watch closely for changes in your health, and be sure to contact your doctor if:  ? · You have a fever. ? · You are not getting better after 2 days. ? · Your symptoms come back after you finish your medicines. Where can you learn more? Go to http://sonia-sagrario.info/. Enter X311 in the search box to learn more about \"Vaginal Yeast Infection: Care Instructions. \"  Current as of: October 13, 2016  Content Version: 11.4  © 6101-6795 [employer]. Care instructions adapted under license by [employer] (which disclaims liability or warranty for this information). If you have questions about a medical condition or this instruction, always ask your healthcare professional. [address] any warranty or liability for your use of this information.

## 2018-05-31 ENCOUNTER — OFFICE VISIT (OUTPATIENT)
Dept: FAMILY MEDICINE CLINIC | Age: 38
End: 2018-05-31

## 2018-05-31 VITALS
SYSTOLIC BLOOD PRESSURE: 148 MMHG | DIASTOLIC BLOOD PRESSURE: 90 MMHG | RESPIRATION RATE: 24 BRPM | TEMPERATURE: 98.3 F | OXYGEN SATURATION: 98 % | BODY MASS INDEX: 37.46 KG/M2 | WEIGHT: 211.4 LBS | HEART RATE: 69 BPM | HEIGHT: 63 IN

## 2018-05-31 DIAGNOSIS — R03.0 ELEVATED BLOOD PRESSURE READING: ICD-10-CM

## 2018-05-31 DIAGNOSIS — N92.0 MENORRHAGIA WITH REGULAR CYCLE: Primary | ICD-10-CM

## 2018-05-31 DIAGNOSIS — N94.6 PAINFUL MENSTRUAL PERIODS: ICD-10-CM

## 2018-05-31 NOTE — PROGRESS NOTES
Chief Complaint   Patient presents with    Menstrual Problem     DISCUSS BIRTH CONTROL OR SURGERY; CHANGES TAMPON EVERY 2-3 HOURS (SUPER HEAVY TAMPONS)         Subjective:      Jitendra George is a 41 y.o. female who complains of heavy menses, that has progressively worsened over the past 5 years and has really started to bother her over the past 3 years. She has menses regularly every 28 days and last 4 days but heavy bleeding the entire time. Pad or tampon count: changes every 2 hours. May experience clots of size up to FOUR [facility] ball size at times\". Using super plus tampons and heavy flow pads. Saw GYN last in [facility], had a pap 1/2017 and all pap was normal at that time. She denies symptoms worsening drastically over the past three years but says \"I am just so tired of the heavy bleeding and feeling so bad 4 days out of the month\". Dysmenorrhea: severe, \"feels like a contraction that's how bad they are\", occurring first 1-2 days of flow. Cyclic symptoms include breast tenderness, bloating, fluid retention and pelvic pain, irritable and emotional.    She denies dizziness or HA. History of infertility: no.   Sexual history: single partner, contraception - vasectomy. She does not want to have any more children, has two children now ages 6 and 15. Prior Pap smear:was normal.  Has not been on birth control for at least 16 years. She has done research and would prefer to not take a daily pill. Would like referral to GYN to discuss surgical options. There are no active problems to display for this patient. Current Outpatient Prescriptions   Medication Sig Dispense Refill    citalopram (CELEXA) 20 mg tablet Take 1 Tab by mouth daily. 30 Tab 6     No Known Allergies  Past Medical History:   Diagnosis Date    Anxiety and depression     Hypertension      History reviewed. No pertinent surgical history.   Family History   Problem Relation Age of Onset    Cancer Maternal Grandfather      Social History   Substance Use Topics    Smoking status: Current Some Day Smoker     Packs/day: 0.25    Smokeless tobacco: Never Used      Comment: no vaping,     Alcohol use Yes      Review of Systems  Gen: no fatigue, fever, chills  Eyes: no excessive tearing, itching, or discharge  Nose: no rhinorrhea, no sinus pain  Mouth: no oral lesions, no sore throat  Resp: no shortness of breath, no wheezing, no cough  CV: no chest pain, no paroxysmal nocturnal dyspnea  Abd: no nausea, no heartburn, no diarrhea, no constipation, no abdominal pain  Neuro: no headaches, no syncope or presyncopal episodes  Endo: no polyuria, no polydipsia  Heme: no lymphadenopathy, no easy bruising        Objective:     Visit Vitals    /90    Pulse 69    Temp 98.3 °F (36.8 °C) (Oral)    Resp 24    Ht 5' 3\" (1.6 m)    Wt 211 lb 6.4 oz (95.9 kg)    LMP 05/11/2018 (Exact Date)    SpO2 98%    BMI 37.45 kg/m2      Physical Exam:   Gen: alert, oriented, no acute distress  Head: normocephalic, atraumatic  Ears: external auditory canals clear, TMs without erythema or effusion  Eyes: pupils equal round reactive to light, sclera clear, conjunctiva clear  Nose: normal turbinates, no rhinorrhea  Oral: moist mucus membranes, no oral lesions, no pharyngeal inflammation or exudate  Neck: supple, no lymphadenopathy  Resp: no increased work of breathing, lungs clear to ausculation bilaterally  CV: S1, S2 normal, no murmurs, rubs, or gallops. Abd: soft, not tender, not distended. No hepatosplenomegaly. Normal bowel sounds. No hernias. Neuro: cranial nerves intact, normal strength and movement in all extremities, reflexes and sensation intact and symmetric. Skin: no lesion or rash        Assessment/Plan:   Differential diagnosis and treatment options reviewed with patient who is in agreement with treatment plan as outlined below. ICD-10-CM ICD-9-CM    1. Menorrhagia with regular cycle N92.0 626.2 REFERRAL TO OBSTETRICS AND GYNECOLOGY   2. Painful menstrual periods N94.6 625.3 REFERRAL TO OBSTETRICS AND GYNECOLOGY   3. Elevated blood pressure reading R03.0 796.2         Referral to GYN for further evaluation of AUB. Would like to discuss uterine ablation or IUD insert for menses control. Does not want to take daily OCP or shot. BP elevated a little today. She notes that she has been told for \"years\" that her BP is elevated. Says that at one point in the past she was on a medicine (does not recall the name) and that medicine made her have HA so she stopped taking it. She notes that she is not watching her salt intake and that she usually drinks two cups coffee in AM.  Drinks water and walks daily. She would does not really want to be on daily medication, will try lowering salt intake and return in one month for BP check. Discussed BMI and healthy weight. Encouraged patient to work to implement changes including diet high in raw fruits and vegetables, lean protein and good fats. Limit refined, processed carbohydrates and sugar. DASH and mediterranean diet discussed and given in AVS  Encouraged regular exercise. Verbal and written instructions (see AVS) provided. Patient expresses understanding and agreement of diagnosis and treatment plan.

## 2018-05-31 NOTE — PROGRESS NOTES
Chief Complaint   Patient presents with    Menstrual Problem     DISCUSS BIRTH CONTROL OR SURGERY; CHANGES TAMPON EVERY 2-3 HOURS (SUPER HEAVY TAMPONS)     1. Have you been to the ER, urgent care clinic since your last visit? Hospitalized since your last visit? No    2. Have you seen or consulted any other health care providers outside of the [address] [place] [address] since your last visit? Include any pap smears or colon screening. No     There are no preventive care reminders to display for this patient.

## 2018-05-31 NOTE — PATIENT INSTRUCTIONS
Painful Menstrual Cramps: Care Instructions  Your Care Instructions    Painful menstrual cramps are very common. Many women go to the doctor because of bad cramps when they get their period. You may have cramps in your back, thighs, and belly. You may also have diarrhea, constipation, or nausea. Some women also get dizzy. Pain medicine and home treatment can help you feel better. Follow-up care is a key part of your treatment and safety. Be sure to make and go to all appointments, and call your doctor if you are having problems. It's also a good idea to know your test results and keep a list of the medicines you take. How can you care for yourself at home? · Take anti-inflammatory medicines for pain. Ibuprofen (Advil, Motrin) and naproxen (Aleve) usually work better than aspirin. ¨ Be safe with medicines. Talk to your doctor or pharmacist before you take any of these medicines. They may not be safe if you take other medicines or have other health problems. ¨ Start taking the recommended dose of pain medicine as soon as you start to feel pain. Or you can start on the day before your period. Keep taking the medicine for as many days as you have cramps. ¨ If anti-inflammatory medicines don't help, try acetaminophen (Tylenol). ¨ Do not take two or more pain medicines at the same time unless the doctor told you to. Many pain medicines have acetaminophen, which is Tylenol. Too much acetaminophen (Tylenol) can be harmful. ¨ Read and follow all instructions on the label. · Put a heating pad set on low or a hot water bottle on your belly. Or take a warm bath. Heat improves blood flow and may help with pain. · Lie down and put a pillow under your knees. Or lie on your side and bring your knees up to your chest. This will help with any back pressure. · Get at least 30 minutes of exercise on most days of the week. This improves blood flow and may decrease pain. Walking is a good choice.  You also may want to do other activities, such as running, swimming, cycling, or playing tennis or team sports. When should you call for help? Call your doctor now or seek immediate medical care if:  ? · You have new or worse belly or pelvic pain. ? · You have severe vaginal bleeding. ? Watch closely for changes in your health, and be sure to contact your doctor if:  ? · You have unusual vaginal bleeding. ? · You do not get better as expected. Where can you learn more? Go to http://sonia-sagrario.info/. Enter [id number] in the search box to learn more about \"Painful Menstrual Cramps: Care Instructions. \"  Current as of: October 13, 2016  Content Version: 11.4  © 9039-0000 [employer]. Care instructions adapted under license by [employer] (which disclaims liability or warranty for this information). If you have questions about a medical condition or this instruction, always ask your healthcare professional. [place] [address] any warranty or liability for your use of this information. Abnormal Uterine Bleeding: Care Instructions  Your Care Instructions    Abnormal uterine bleeding (AUB) is irregular bleeding from the uterus that is longer or heavier than usual or does not occur at your regular time. Sometimes it is caused by changes in hormone levels. It can also be caused by growths in the uterus, such as fibroids or polyps. Sometimes a cause cannot be found. You may have heavy bleeding when you are not expecting your period. Your doctor may suggest a pregnancy test, if you think you are pregnant. Follow-up care is a key part of your treatment and safety. Be sure to make and go to all appointments, and call your doctor if you are having problems. It's also a good idea to know your test results and keep a list of the medicines you take. How can you care for yourself at home? · Be safe with medicines. Take pain medicines exactly as directed.   ¨ If the doctor gave you a prescription medicine for pain, take it as prescribed. ¨ If you are not taking a prescription pain medicine, ask your doctor if you can take an over-the-counter medicine. · You may be low in iron because of blood loss. Eat a balanced diet that is high in iron and vitamin C. Foods rich in iron include red meat, shellfish, eggs, beans, and leafy green vegetables. Talk to your doctor about whether you need to take iron pills or a multivitamin. When should you call for help? Call 911 anytime you think you may need emergency care. For example, call if:  ? · You passed out (lost consciousness). ?Call your doctor now or seek immediate medical care if:  ? · You have new or worse belly or pelvic pain. ? · You have severe vaginal bleeding. ? · You feel dizzy or lightheaded, or you feel like you may faint. ? Watch closely for changes in your health, and be sure to contact your doctor if:  ? · You think you may be pregnant. ? · Your bleeding gets worse. ? · You do not get better as expected. Where can you learn more? Go to http://soniaDartfishsagrario.info/. Enter A959 in the search box to learn more about \"Abnormal Uterine Bleeding: Care Instructions. \"  Current as of: October 13, 2016  Content Version: 11.4  © 2414-7544 [employer]. Care instructions adapted under license by [employer] (which disclaims liability or warranty for this information). If you have questions about a medical condition or this instruction, always ask your healthcare professional. [place] [address] any warranty or liability for your use of this information. DASH Diet: Care Instructions  Your Care Instructions    The DASH diet is an eating plan that can help lower your blood pressure. DASH stands for Dietary Approaches to Stop Hypertension. Hypertension is high blood pressure. The DASH diet focuses on eating foods that are high in calcium, potassium, and magnesium.  These nutrients can lower blood pressure. The foods that are highest in these nutrients are fruits, vegetables, low-fat dairy products, nuts, seeds, and legumes. But taking calcium, potassium, and magnesium supplements instead of eating foods that are high in those nutrients does not have the same effect. The DASH diet also includes whole grains, fish, and poultry. The DASH diet is one of several lifestyle changes your doctor may recommend to lower your high blood pressure. Your doctor may also want you to decrease the amount of sodium in your diet. Lowering sodium while following the DASH diet can lower blood pressure even further than just the DASH diet alone. Follow-up care is a key part of your treatment and safety. Be sure to make and go to all appointments, and call your doctor if you are having problems. It's also a good idea to know your test results and keep a list of the medicines you take. How can you care for yourself at home? Following the DASH diet  · Eat 4 to 5 servings of fruit each day. A serving is 1 medium-sized piece of fruit, ½ cup chopped or canned fruit, 1/4 cup dried fruit, or 4 ounces (½ cup) of fruit juice. Choose fruit more often than fruit juice. · Eat 4 to 5 servings of vegetables each day. A serving is 1 cup of lettuce or raw leafy vegetables, ½ cup of chopped or cooked vegetables, or 4 ounces (½ cup) of vegetable juice. Choose vegetables more often than vegetable juice. · Get 2 to 3 servings of low-fat and fat-free dairy each day. A serving is 8 ounces of milk, 1 cup of yogurt, or 1 ½ ounces of cheese. · Eat 6 to 8 servings of grains each day. A serving is 1 slice of bread, 1 ounce of dry cereal, or ½ cup of cooked rice, pasta, or cooked cereal. Try to choose whole-grain products as much as possible. · Limit lean meat, poultry, and fish to 2 servings each day. A serving is 3 ounces, about the size of a deck of cards.   · Eat 4 to 5 servings of nuts, seeds, and legumes (cooked dried beans, lentils, and split peas) each week. A serving is 1/3 cup of nuts, 2 tablespoons of seeds, or ½ cup of cooked beans or peas. · Limit fats and oils to 2 to 3 servings each day. A serving is 1 teaspoon of vegetable oil or 2 tablespoons of salad dressing. · Limit sweets and added sugars to 5 servings or less a week. A serving is 1 tablespoon jelly or jam, ½ cup sorbet, or 1 cup of lemonade. · Eat less than 2,300 milligrams (mg) of sodium a day. If you limit your sodium to 1,500 mg a day, you can lower your blood pressure even more. Tips for success  · Start small. Do not try to make dramatic changes to your diet all at once. You might feel that you are missing out on your favorite foods and then be more likely to not follow the plan. Make small changes, and stick with them. Once those changes become habit, add a few more changes. · Try some of the following:  ¨ Make it a goal to eat a fruit or vegetable at every meal and at snacks. This will make it easy to get the recommended amount of fruits and vegetables each day. ¨ Try yogurt topped with fruit and nuts for a snack or healthy dessert. ¨ Add lettuce, tomato, cucumber, and onion to sandwiches. ¨ Combine a ready-made pizza crust with low-fat mozzarella cheese and lots of vegetable toppings. Try using tomatoes, squash, spinach, broccoli, carrots, cauliflower, and onions. ¨ Have a variety of cut-up vegetables with a low-fat dip as an appetizer instead of chips and dip. ¨ Sprinkle sunflower seeds or chopped almonds over salads. Or try adding chopped walnuts or almonds to cooked vegetables. ¨ Try some vegetarian meals using beans and peas. Add garbanzo or kidney beans to salads. Make burritos and tacos with mashed wooten beans or black beans. Where can you learn more? Go to http://sonia-sagrario.info/. Enter R210 in the search box to learn more about \"DASH Diet: Care Instructions. \"  Current as of: September 21, 2016  Content Version: 11.4  © 6960-8679 [employer], [employer] ([employer] Care instructions adapted under license by [employer] (which disclaims liability or warranty for this information). If you have questions about a medical condition or this instruction, always ask your healthcare professional. [address] any warranty or liability for your use of this information. DASH Diet: Care Instructions  Your Care Instructions    The DASH diet is an eating plan that can help lower your blood pressure. DASH stands for Dietary Approaches to Stop Hypertension. Hypertension is high blood pressure. The DASH diet focuses on eating foods that are high in calcium, potassium, and magnesium. These nutrients can lower blood pressure. The foods that are highest in these nutrients are fruits, vegetables, low-fat dairy products, nuts, seeds, and legumes. But taking calcium, potassium, and magnesium supplements instead of eating foods that are high in those nutrients does not have the same effect. The DASH diet also includes whole grains, fish, and poultry. The DASH diet is one of several lifestyle changes your doctor may recommend to lower your high blood pressure. Your doctor may also want you to decrease the amount of sodium in your diet. Lowering sodium while following the DASH diet can lower blood pressure even further than just the DASH diet alone. Follow-up care is a key part of your treatment and safety. Be sure to make and go to all appointments, and call your doctor if you are having problems. It's also a good idea to know your test results and keep a list of the medicines you take. How can you care for yourself at home? Following the DASH diet  · Eat 4 to 5 servings of fruit each day. A serving is 1 medium-sized piece of fruit, ½ cup chopped or canned fruit, 1/4 cup dried fruit, or 4 ounces (½ cup) of fruit juice. Choose fruit more often than fruit juice. · Eat 4 to 5 servings of vegetables each day.  A serving is 1 cup of lettuce or raw leafy vegetables, ½ cup of chopped or cooked vegetables, or 4 ounces (½ cup) of vegetable juice. Choose vegetables more often than vegetable juice. · Get 2 to 3 servings of low-fat and fat-free dairy each day. A serving is 8 ounces of milk, 1 cup of yogurt, or 1 ½ ounces of cheese. · Eat 6 to 8 servings of grains each day. A serving is 1 slice of bread, 1 ounce of dry cereal, or ½ cup of cooked rice, pasta, or cooked cereal. Try to choose whole-grain products as much as possible. · Limit lean meat, poultry, and fish to 2 servings each day. A serving is 3 ounces, about the size of a deck of cards. · Eat 4 to 5 servings of nuts, seeds, and legumes (cooked dried beans, lentils, and split peas) each week. A serving is 1/3 cup of nuts, 2 tablespoons of seeds, or ½ cup of cooked beans or peas. · Limit fats and oils to 2 to 3 servings each day. A serving is 1 teaspoon of vegetable oil or 2 tablespoons of salad dressing. · Limit sweets and added sugars to 5 servings or less a week. A serving is 1 tablespoon jelly or jam, ½ cup sorbet, or 1 cup of lemonade. · Eat less than 2,300 milligrams (mg) of sodium a day. If you limit your sodium to 1,500 mg a day, you can lower your blood pressure even more. Tips for success  · Start small. Do not try to make dramatic changes to your diet all at once. You might feel that you are missing out on your favorite foods and then be more likely to not follow the plan. Make small changes, and stick with them. Once those changes become habit, add a few more changes. · Try some of the following:  ¨ Make it a goal to eat a fruit or vegetable at every meal and at snacks. This will make it easy to get the recommended amount of fruits and vegetables each day. ¨ Try yogurt topped with fruit and nuts for a snack or healthy dessert. ¨ Add lettuce, tomato, cucumber, and onion to sandwiches.   ¨ Combine a ready-made pizza crust with low-fat mozzarella cheese and lots of vegetable toppings. Try using tomatoes, squash, spinach, broccoli, carrots, cauliflower, and onions. ¨ Have a variety of cut-up vegetables with a low-fat dip as an appetizer instead of chips and dip. ¨ Sprinkle sunflower seeds or chopped almonds over salads. Or try adding chopped walnuts or almonds to cooked vegetables. ¨ Try some vegetarian meals using beans and peas. Add garbanzo or kidney beans to salads. Make burritos and tacos with mashed wooten beans or black beans. Where can you learn more? Go to http://sonia-sagrario.info/. Enter G781 in the search box to learn more about \"DASH Diet: Care Instructions. \"  Current as of: September 21, 2016  Content Version: 11.4  © 5445-2538 [employer]. Care instructions adapted under license by [facility] (which disclaims liability or warranty for this information). If you have questions about a medical condition or this instruction, always ask your healthcare professional. [place] [address] any warranty or liability for your use of this information.

## 2018-05-31 NOTE — MR AVS SNAPSHOT
Tong Izquierdo 
 
 
 [address] 
[phone] Patient: Nataliia Carrion MRN: [id number] WZO:0/83/9829 Visit Information Date & Time Provider Department Dept. Phone Encounter #  
 5/31/2018 10:30 AM Melanie Merrill, [address] [address] [phone] [id number] Follow-up Instructions Return in about 4 weeks (around 6/28/2018), or if symptoms worsen or fail to improve. Upcoming Health Maintenance Date Due Influenza Age 5 to Adult 8/1/2018 PAP AKA CERVICAL CYTOLOGY 5/15/2020 DTaP/Tdap/Td series (2 - Td) 5/15/2027 Allergies as of 5/31/2018  Review Complete On: 5/31/2018 By: Melanie Merrill NP No Known Allergies Current Immunizations  Reviewed on 1/15/2018 Name Date Influenza Vaccine 10/30/2015, 10/17/2014, 11/26/2012, 11/5/2008, 11/2/2007 Influenza Vaccine (Quad) PF 10/27/2017, 10/17/2016 TB Skin Test (PPD) 8/7/2012 Not reviewed this visit You Were Diagnosed With   
  
 Codes Comments Menorrhagia with regular cycle    -  Primary ICD-10-CM: N92.0 ICD-9-CM: 626.2 Painful menstrual periods     ICD-10-CM: N94.6 ICD-9-CM: 625.3 Elevated blood pressure reading     ICD-10-CM: R03.0 ICD-9-CM: 796.2 Vitals BP Pulse Temp Resp Height(growth percentile) Weight(growth percentile) 148/90 69 98.3 °F (36.8 °C) (Oral) 24 5' 3\" (1.6 m) 211 lb 6.4 oz (95.9 kg) LMP SpO2 BMI OB Status Smoking Status 05/11/2018 (Exact Date) 98% 37.45 kg/m2 Having regular periods Current Some Day Smoker Vitals History BMI and BSA Data Body Mass Index Body Surface Area  
 37.45 kg/m 2 2.06 m 2 Preferred Pharmacy Pharmacy Name Phone [facility], [address] [phone] Your Updated Medication List  
  
   
This list is accurate as of 5/31/18 11:14 AM.  Always use your most recent med list.  
  
 citalopram 20 mg tablet Commonly known as:  [place] Stage Take 1 Tab by mouth daily. We Performed the Following REFERRAL TO OBSTETRICS AND GYNECOLOGY [REF51 Custom] Comments:  
 Abnormal uterine bleeding Follow-up Instructions Return in about 4 weeks (around 6/28/2018), or if symptoms worsen or fail to improve. Referral Information Referral ID Referred By Referred To  
  
 [id number] Tanmay ROD [address] [place], [address] [facility] Visits Status Start Date End Date 1 New Request 5/31/18 5/31/19 If your referral has a status of pending review or denied, additional information will be sent to support the outcome of this decision. Patient Instructions Painful Menstrual Cramps: Care Instructions Your Care Instructions Painful menstrual cramps are very common. Many women go to the doctor because of bad cramps when they get their period. You may have cramps in your back, thighs, and belly. You may also have diarrhea, constipation, or nausea. Some women also get dizzy. Pain medicine and home treatment can help you feel better. Follow-up care is a key part of your treatment and safety. Be sure to make and go to all appointments, and call your doctor if you are having problems. It's also a good idea to know your test results and keep a list of the medicines you take. How can you care for yourself at home? · Take anti-inflammatory medicines for pain. Ibuprofen (Advil, Motrin) and naproxen (Aleve) usually work better than aspirin. ¨ Be safe with medicines. Talk to your doctor or pharmacist before you take any of these medicines. They may not be safe if you take other medicines or have other health problems. ¨ Start taking the recommended dose of pain medicine as soon as you start to feel pain. Or you can start on the day before your period.  Keep taking the medicine for as many days as you have cramps. ¨ If anti-inflammatory medicines don't help, try acetaminophen (Tylenol). ¨ Do not take two or more pain medicines at the same time unless the doctor told you to. Many pain medicines have acetaminophen, which is Tylenol. Too much acetaminophen (Tylenol) can be harmful. ¨ Read and follow all instructions on the label. · Put a heating pad set on low or a hot water bottle on your belly. Or take a warm bath. Heat improves blood flow and may help with pain. · Lie down and put a pillow under your knees. Or lie on your side and bring your knees up to your chest. This will help with any back pressure. · Get at least 30 minutes of exercise on most days of the week. This improves blood flow and may decrease pain. Walking is a good choice. You also may want to do other activities, such as running, swimming, cycling, or playing tennis or team sports. When should you call for help? Call your doctor now or seek immediate medical care if: 
? · You have new or worse belly or pelvic pain. ? · You have severe vaginal bleeding. ? Watch closely for changes in your health, and be sure to contact your doctor if: 
? · You have unusual vaginal bleeding. ? · You do not get better as expected. Where can you learn more? Go to http://sonia-sagrario.info/. Enter [id number] in the search box to learn more about \"Painful Menstrual Cramps: Care Instructions. \" Current as of: October 13, 2016 Content Version: 11.4 © 8580-5923 Worldcoo. Care instructions adapted under license by [employer] (which disclaims liability or warranty for this information). If you have questions about a medical condition or this instruction, always ask your healthcare professional. [place] [address] any warranty or liability for your use of this information. Abnormal Uterine Bleeding: Care Instructions Your Care Instructions Abnormal uterine bleeding (AUB) is irregular bleeding from the uterus that is longer or heavier than usual or does not occur at your regular time. Sometimes it is caused by changes in hormone levels. It can also be caused by growths in the uterus, such as fibroids or polyps. Sometimes a cause cannot be found. You may have heavy bleeding when you are not expecting your period. Your doctor may suggest a pregnancy test, if you think you are pregnant. Follow-up care is a key part of your treatment and safety. Be sure to make and go to all appointments, and call your doctor if you are having problems. It's also a good idea to know your test results and keep a list of the medicines you take. How can you care for yourself at home? · Be safe with medicines. Take pain medicines exactly as directed. ¨ If the doctor gave you a prescription medicine for pain, take it as prescribed. ¨ If you are not taking a prescription pain medicine, ask your doctor if you can take an over-the-counter medicine. · You may be low in iron because of blood loss. Eat a balanced diet that is high in iron and vitamin C. Foods rich in iron include red meat, shellfish, eggs, beans, and leafy green vegetables. Talk to your doctor about whether you need to take iron pills or a multivitamin. When should you call for help? Call 911 anytime you think you may need emergency care. For example, call if: 
? · You passed out (lost consciousness). ?Call your doctor now or seek immediate medical care if: 
? · You have new or worse belly or pelvic pain. ? · You have severe vaginal bleeding. ? · You feel dizzy or lightheaded, or you feel like you may faint. ? Watch closely for changes in your health, and be sure to contact your doctor if: 
? · You think you may be pregnant. ? · Your bleeding gets worse. ? · You do not get better as expected. Where can you learn more? Go to http://sonia-sagrario.info/. Enter M855 in the search box to learn more about \"Abnormal Uterine Bleeding: Care Instructions. \" Current as of: October 13, 2016 Content Version: 11.4 © 5668-4371 ACTON. Care instructions adapted under license by H[employer] (which disclaims liability or warranty for this information). If you have questions about a medical condition or this instruction, always ask your healthcare professional. [address] any warranty or liability for your use of this information. DASH Diet: Care Instructions Your Care Instructions The DASH diet is an eating plan that can help lower your blood pressure. DASH stands for Dietary Approaches to Stop Hypertension. Hypertension is high blood pressure. The DASH diet focuses on eating foods that are high in calcium, potassium, and magnesium. These nutrients can lower blood pressure. The foods that are highest in these nutrients are fruits, vegetables, low-fat dairy products, nuts, seeds, and legumes. But taking calcium, potassium, and magnesium supplements instead of eating foods that are high in those nutrients does not have the same effect. The DASH diet also includes whole grains, fish, and poultry. The DASH diet is one of several lifestyle changes your doctor may recommend to lower your high blood pressure. Your doctor may also want you to decrease the amount of sodium in your diet. Lowering sodium while following the DASH diet can lower blood pressure even further than just the DASH diet alone. Follow-up care is a key part of your treatment and safety. Be sure to make and go to all appointments, and call your doctor if you are having problems. It's also a good idea to know your test results and keep a list of the medicines you take. How can you care for yourself at home? Following the DASH diet · Eat 4 to 5 servings of fruit each day.  A serving is 1 medium-sized piece of fruit, ½ cup chopped or canned fruit, 1/4 cup dried fruit, or 4 ounces (½ cup) of fruit juice. Choose fruit more often than fruit juice. · Eat 4 to 5 servings of vegetables each day. A serving is 1 cup of lettuce or raw leafy vegetables, ½ cup of chopped or cooked vegetables, or 4 ounces (½ cup) of vegetable juice. Choose vegetables more often than vegetable juice. · Get 2 to 3 servings of low-fat and fat-free dairy each day. A serving is 8 ounces of milk, 1 cup of yogurt, or 1 ½ ounces of cheese. · Eat 6 to 8 servings of grains each day. A serving is 1 slice of bread, 1 ounce of dry cereal, or ½ cup of cooked rice, pasta, or cooked cereal. Try to choose whole-grain products as much as possible. · Limit lean meat, poultry, and fish to 2 servings each day. A serving is 3 ounces, about the size of a deck of cards. · Eat 4 to 5 servings of nuts, seeds, and legumes (cooked dried beans, lentils, and split peas) each week. A serving is 1/3 cup of nuts, 2 tablespoons of seeds, or ½ cup of cooked beans or peas. · Limit fats and oils to 2 to 3 servings each day. A serving is 1 teaspoon of vegetable oil or 2 tablespoons of salad dressing. · Limit sweets and added sugars to 5 servings or less a week. A serving is 1 tablespoon jelly or jam, ½ cup sorbet, or 1 cup of lemonade. · Eat less than 2,300 milligrams (mg) of sodium a day. If you limit your sodium to 1,500 mg a day, you can lower your blood pressure even more. Tips for success · Start small. Do not try to make dramatic changes to your diet all at once. You might feel that you are missing out on your favorite foods and then be more likely to not follow the plan. Make small changes, and stick with them. Once those changes become habit, add a few more changes. · Try some of the following: ¨ Make it a goal to eat a fruit or vegetable at every meal and at snacks. This will make it easy to get the recommended amount of fruits and vegetables each day. ¨ Try yogurt topped with fruit and nuts for a snack or healthy dessert. ¨ Add lettuce, tomato, cucumber, and onion to sandwiches. ¨ Combine a ready-made pizza crust with low-fat mozzarella cheese and lots of vegetable toppings. Try using tomatoes, squash, spinach, broccoli, carrots, cauliflower, and onions. ¨ Have a variety of cut-up vegetables with a low-fat dip as an appetizer instead of chips and dip. ¨ Sprinkle sunflower seeds or chopped almonds over salads. Or try adding chopped walnuts or almonds to cooked vegetables. ¨ Try some vegetarian meals using beans and peas. Add garbanzo or kidney beans to salads. Make burritos and tacos with mashed wooten beans or black beans. Where can you learn more? Go to http://soniaChunnel.TVsagrario.info/. Enter Q488 in the search box to learn more about \"DASH Diet: Care Instructions. \" Current as of: September 21, 2016 Content Version: 11.4 © 4667-2158 [employer]. Care instructions adapted under license by [employer] (which disclaims liability or warranty for this information). If you have questions about a medical condition or this instruction, always ask your healthcare professional. [address] any warranty or liability for your use of this information. DASH Diet: Care Instructions Your Care Instructions The DASH diet is an eating plan that can help lower your blood pressure. DASH stands for Dietary Approaches to Stop Hypertension. Hypertension is high blood pressure. The DASH diet focuses on eating foods that are high in calcium, potassium, and magnesium. These nutrients can lower blood pressure. The foods that are highest in these nutrients are fruits, vegetables, low-fat dairy products, nuts, seeds, and legumes. But taking calcium, potassium, and magnesium supplements instead of eating foods that are high in those nutrients does not have the same effect.  The DASH diet also includes whole grains, fish, and poultry. The DASH diet is one of several lifestyle changes your doctor may recommend to lower your high blood pressure. Your doctor may also want you to decrease the amount of sodium in your diet. Lowering sodium while following the DASH diet can lower blood pressure even further than just the DASH diet alone. Follow-up care is a key part of your treatment and safety. Be sure to make and go to all appointments, and call your doctor if you are having problems. It's also a good idea to know your test results and keep a list of the medicines you take. How can you care for yourself at home? Following the DASH diet · Eat 4 to 5 servings of fruit each day. A serving is 1 medium-sized piece of fruit, ½ cup chopped or canned fruit, 1/4 cup dried fruit, or 4 ounces (½ cup) of fruit juice. Choose fruit more often than fruit juice. · Eat 4 to 5 servings of vegetables each day. A serving is 1 cup of lettuce or raw leafy vegetables, ½ cup of chopped or cooked vegetables, or 4 ounces (½ cup) of vegetable juice. Choose vegetables more often than vegetable juice. · Get 2 to 3 servings of low-fat and fat-free dairy each day. A serving is 8 ounces of milk, 1 cup of yogurt, or 1 ½ ounces of cheese. · Eat 6 to 8 servings of grains each day. A serving is 1 slice of bread, 1 ounce of dry cereal, or ½ cup of cooked rice, pasta, or cooked cereal. Try to choose whole-grain products as much as possible. · Limit lean meat, poultry, and fish to 2 servings each day. A serving is 3 ounces, about the size of a deck of cards. · Eat 4 to 5 servings of nuts, seeds, and legumes (cooked dried beans, lentils, and split peas) each week. A serving is 1/3 cup of nuts, 2 tablespoons of seeds, or ½ cup of cooked beans or peas. · Limit fats and oils to 2 to 3 servings each day. A serving is 1 teaspoon of vegetable oil or 2 tablespoons of salad dressing. · Limit sweets and added sugars to 5 servings or less a week.  A serving is 1 tablespoon jelly or jam, ½ cup sorbet, or 1 cup of lemonade. · Eat less than 2,300 milligrams (mg) of sodium a day. If you limit your sodium to 1,500 mg a day, you can lower your blood pressure even more. Tips for success · Start small. Do not try to make dramatic changes to your diet all at once. You might feel that you are missing out on your favorite foods and then be more likely to not follow the plan. Make small changes, and stick with them. Once those changes become habit, add a few more changes. · Try some of the following: ¨ Make it a goal to eat a fruit or vegetable at every meal and at snacks. This will make it easy to get the recommended amount of fruits and vegetables each day. ¨ Try yogurt topped with fruit and nuts for a snack or healthy dessert. ¨ Add lettuce, tomato, cucumber, and onion to sandwiches. ¨ Combine a ready-made pizza crust with low-fat mozzarella cheese and lots of vegetable toppings. Try using tomatoes, squash, spinach, broccoli, carrots, cauliflower, and onions. ¨ Have a variety of cut-up vegetables with a low-fat dip as an appetizer instead of chips and dip. ¨ Sprinkle sunflower seeds or chopped almonds over salads. Or try adding chopped walnuts or almonds to cooked vegetables. ¨ Try some vegetarian meals using beans and peas. Add garbanzo or kidney beans to salads. Make burritos and tacos with mashed wooten beans or black beans. Where can you learn more? Go to http://sonia-sagrario.info/. Enter O576 in the search box to learn more about \"DASH Diet: Care Instructions. \" Current as of: September 21, 2016 Content Version: 11.4 © 2808-7254 FetchBack. Care instructions adapted under license by [employer]ix[employer] (which disclaims liability or warranty for this information).  If you have questions about a medical condition or this instruction, always ask your healthcare professional. [employer] disclaims any warranty or liability for your use of this information. Introducing [facility]! Dear Ralf Kilgore: Thank you for requesting a [employer] account. Our records indicate that you already have an active [employer] account. You can access your account anytime at [email or web address] Did you know that you can access your hospital and ER discharge instructions at any time in [employer]? You can also review all of your test results from your hospital stay or ER visit. Additional Information If you have questions, please visit the Frequently Asked Questions section of the [employer] website at [email or web address] Remember, [employer] is NOT to be used for urgent needs. For medical emergencies, dial 911. Now available from your iPhone and Android! Please provide this summary of care documentation to your next provider. Your primary care clinician is listed as ROHITH ROD. If you have any questions after today's visit, please call [phone].

## 2018-06-26 ENCOUNTER — CLINICAL SUPPORT (OUTPATIENT)
Dept: FAMILY MEDICINE CLINIC | Age: 38
End: 2018-06-26

## 2018-06-26 VITALS
DIASTOLIC BLOOD PRESSURE: 89 MMHG | HEART RATE: 68 BPM | SYSTOLIC BLOOD PRESSURE: 150 MMHG | HEIGHT: 63 IN | TEMPERATURE: 98.4 F | OXYGEN SATURATION: 98 % | RESPIRATION RATE: 16 BRPM

## 2018-06-26 DIAGNOSIS — N92.0 MENORRHAGIA WITH REGULAR CYCLE: Primary | ICD-10-CM

## 2018-06-26 DIAGNOSIS — N94.0: ICD-10-CM

## 2018-06-26 LAB
HCG URINE, QL. (POC): NEGATIVE
VALID INTERNAL CONTROL?: YES

## 2018-06-26 RX ORDER — MEDROXYPROGESTERONE ACETATE 150 MG/ML
150 INJECTION, SUSPENSION INTRAMUSCULAR ONCE
Qty: 1 ML | Refills: 0 | Status: SHIPPED | COMMUNITY
Start: 2018-06-26 | End: 2018-06-26

## 2018-06-26 NOTE — MR AVS SNAPSHOT
[facility] 
 
 
 [address] [place], [place] [address]le[place] [facility] 
[phone] Patient: Nataliia Carrion MRN: [id number] ROLO:[birth year] Visit Information Date & Time Provider Department Dept. Phone Encounter #  
 6/26/2018  3:15 PM Marcela Johnson MD [address] [facility] [phone] [id number] Upcoming Health Maintenance Date Due Influenza Age 5 to Adult 8/1/2018 PAP AKA CERVICAL CYTOLOGY 5/15/2020 DTaP/Tdap/Td series (2 - Td) 5/15/2027 Allergies as of 6/26/2018  Review Complete On: 6/26/2018 By: Christiano Romero LPN No Known Allergies Current Immunizations  Reviewed on 1/15/2018 Name Date Influenza Vaccine 10/30/2015, 10/17/2014, 11/26/2012, 11/5/2008, 11/2/2007 Influenza Vaccine (Quad) PF 10/27/2017, 10/17/2016 TB Skin Test (PPD) 8/7/2012 Not reviewed this visit You Were Diagnosed With   
  
 Codes Comments Menorrhagia with regular cycle    -  Primary ICD-10-CM: N92.0 ICD-9-CM: 626.2 Intermenstrual pain     ICD-10-CM: N94.0 ICD-9-CM: 625.2 Vitals OB Status Smoking Status Having regular periods Current Some Day Smoker Preferred Pharmacy Pharmacy Name Phone [facility], [address] [phone] Your Updated Medication List  
  
   
This list is accurate as of 6/26/18  3:45 PM.  Always use your most recent med list.  
  
  
  
  
 citalopram 20 mg tablet Commonly known as:  [place] Sly Take 1 Tab by mouth daily. medroxyPROGESTERone 150 mg/mL injection Commonly known as:  DEPO-PROVERA  
1 mL by IntraMUSCular route once for 1 dose. We Performed the Following AMB POC URINE PREGNANCY TEST, VISUAL COLOR COMPARISON [59454 CPT(R)] [place] THER/PROPH/DIAG INJECTION, SUBCUT/IM D022141 CPT(R)] Patient Instructions Next Depo-Provera due September 11-25, 2018 Introducing [facility]! Dear Allegra Ahuja: Thank you for requesting a [employer] account. Our records indicate that you already have an active [employer] account. You can access your account anytime at [email or web address] Did you know that you can access your hospital and ER discharge instructions at any time in [employer]? You can also review all of your test results from your hospital stay or ER visit. Additional Information If you have questions, please visit the Frequently Asked Questions section of the [employer] website at [email or web address] Remember, [employer] is NOT to be used for urgent needs. For medical emergencies, dial 911. Now available from your iPhone and Android! Please provide this summary of care documentation to your next provider. Your primary care clinician is listed as ROHITH ROD. If you have any questions after today's visit, please call [phone].

## 2018-08-09 ENCOUNTER — OFFICE VISIT (OUTPATIENT)
Dept: FAMILY MEDICINE CLINIC | Age: 38
End: 2018-08-09

## 2018-08-09 VITALS
HEART RATE: 88 BPM | WEIGHT: 206 LBS | RESPIRATION RATE: 14 BRPM | SYSTOLIC BLOOD PRESSURE: 157 MMHG | DIASTOLIC BLOOD PRESSURE: 98 MMHG | TEMPERATURE: 98 F | BODY MASS INDEX: 36.5 KG/M2 | HEIGHT: 63 IN

## 2018-08-09 DIAGNOSIS — F32.A ANXIETY AND DEPRESSION: ICD-10-CM

## 2018-08-09 DIAGNOSIS — F41.9 ANXIETY AND DEPRESSION: ICD-10-CM

## 2018-08-09 DIAGNOSIS — F10.20 UNCOMPLICATED ALCOHOL DEPENDENCE (HCC): Primary | ICD-10-CM

## 2018-08-09 DIAGNOSIS — B37.2 CANDIDAL INTERTRIGO: ICD-10-CM

## 2018-08-09 PROBLEM — E66.01 SEVERE OBESITY (BMI 35.0-39.9): Status: ACTIVE | Noted: 2018-08-09

## 2018-08-09 RX ORDER — BUSPIRONE HYDROCHLORIDE 5 MG/1
5 TABLET ORAL
Qty: 90 TAB | Refills: 0 | Status: SHIPPED | OUTPATIENT
Start: 2018-08-09 | End: 2018-08-24 | Stop reason: SDUPTHER

## 2018-08-09 RX ORDER — KETOCONAZOLE 20 MG/G
CREAM TOPICAL DAILY
Qty: 15 G | Refills: 0 | Status: SHIPPED | OUTPATIENT
Start: 2018-08-09 | End: 2018-09-10 | Stop reason: ALTCHOICE

## 2018-08-09 RX ORDER — CITALOPRAM 40 MG/1
40 TABLET, FILM COATED ORAL DAILY
Qty: 30 TAB | Refills: 0 | Status: SHIPPED | OUTPATIENT
Start: 2018-08-09 | End: 2018-08-24 | Stop reason: SDUPTHER

## 2018-08-09 NOTE — MR AVS SNAPSHOT
[address] [facility] 
 
 
 [address] [place] [address] 
[phone] Patient: Nataliia Carrion MRN: [id number] Visit Information Date & Time Provider Department Dept. Phone Encounter #  
 8/9/2018  1:45 PM Sarah Murrell MD [address] [facility] [phone] [id number] Follow-up Instructions Return in about 1 week (around 8/16/2018). Upcoming Health Maintenance Date Due Influenza Age 5 to Adult 8/1/2018 PAP AKA CERVICAL CYTOLOGY 5/15/2020 DTaP/Tdap/Td series (2 - Td) 5/15/2027 Allergies as of 8/9/2018  Review Complete On: 8/9/2018 By: Sarah Murrell MD  
 No Known Allergies Current Immunizations  Reviewed on 1/15/2018 Name Date Influenza Vaccine 10/30/2015, 10/17/2014, 11/26/2012, 11/5/2008, 11/2/2007 Influenza Vaccine (Quad) PF 10/27/2017, 10/17/2016 TB Skin Test (PPD) 8/7/2012 Not reviewed this visit You Were Diagnosed With   
  
 Codes Comments [facility]    -  Primary ICD-10-CM: C92.25 ICD-9-CM: 303.90 Anxiety and depression     ICD-10-CM: F41.9, F32.9 ICD-9-CM: 300.00, 311 Vitals BP Pulse Temp Resp Height(growth percentile) Weight(growth percentile) (!) 157/98 (BP 1 Location: Left arm, BP Patient Position: Sitting) 88 98 °F (36.7 °C) (Oral) 14 5' 3\" (1.6 m) 206 lb (93.4 kg) LMP BMI OB Status Smoking Status 07/12/2018 36.49 kg/m2 Having regular periods Current Some Day Smoker Vitals History BMI and BSA Data Body Mass Index Body Surface Area  
 36.49 kg/m 2 2.04 m 2 Preferred Pharmacy Pharmacy Name Phone [facility] [address] [phone] Your Updated Medication List  
  
   
This list is accurate as of 8/9/18  2:25 PM.  Always use your most recent med list.  
  
  
  
  
 busPIRone 5 mg tablet Commonly known as:  BUSPAR  
 Take 1 Tab by mouth three (3) times daily (with meals). citalopram 40 mg tablet Commonly known as:  Azerbaijani Anthony Take 1 Tab by mouth daily. Prescriptions Sent to Pharmacy Refills  
 citalopram (CELEXA) 40 mg tablet 0 Sig: Take 1 Tab by mouth daily. Class: Normal  
 Pharmacy: [address] #: [phone] Route: Oral  
 busPIRone (BUSPAR) 5 mg tablet 0 Sig: Take 1 Tab by mouth three (3) times daily (with meals). Class: Normal  
 Pharmacy: [address] #: [phone] Route: Oral  
  
We Performed the Following REFERRAL TO PSYCHIATRY [REF91 Custom] Follow-up Instructions Return in about 1 week (around 8/16/2018). Referral Information Referral ID Referred By Referred To  
  
 [id number] Amaya Foster HARI AT [address] [place] [address] 313 [address] [facility], [address] Phone: [phone] Fax: [phone] Visits Status Start Date End Date 1 New Request 8/9/18 8/9/19 If your referral has a status of pending review or denied, additional information will be sent to support the outcome of this decision. Patient Instructions [id number] [facility]: See Referral for phone 1010 [facility], [address] Phone [phone] 1005 [facility] 1401 [facility]. [address], [address] [place] [address], 
[phone] [facility] Address: [address] [place] [address] Right 201 [facility], [address] Phone: [phone] Buspirone (By mouth) Buspirone ([id number]) Treats anxiety. Brand Name(s):  
There may be other brand names for this medicine. When This Medicine Should Not Be Used: You should not use this medicine if you have had an allergic reaction to buspirone. How to Use This Medicine:  
Tablet · Your doctor will tell you how much of this medicine to take and how often. Do not take more medicine or take it more often than your doctor tells you to. · You may take this medicine with or without food, but take it the same way each time. · You may need to take this medicine for 1 or 2 weeks before you begin to feel better. If a dose is missed: · If you miss a dose or forget to take your medicine, take it as soon as you can. If it is almost time for your next dose, wait until then to take the medicine and skip the missed dose. · Do not use extra medicine to make up for a missed dose. How to Store and Dispose of This Medicine: · Store the medicine at room temperature, away from heat, moisture, and direct light. · Keep all medicine out of the reach of children and never share your medicine with anyone. Drugs and Foods to Avoid: Ask your doctor or pharmacist before using any other medicine, including over-the-counter medicines, vitamins, and herbal products. · You should not use buspirone when you are also using an MAO inhibitor (such as Eldepryl®, Marplan®, Nardil®, Parnate®). · Do not eat grapefruit, drink grapefruit juice, or drink alcohol while you are using buspirone. · Make sure your doctor knows if you are also using cimetidine (Levi Kail), dexamethasone (Decadron®), diltiazem (Shah Angulo), erythromycin (Erythro-Tab®), itraconazole (Sporanox®), nefazodone (Serzone®), rifampin (Rifadin®, Rifamate®, Rifater®), verapamil (Mitch Aide), or medicine for seizures (such as Dilantin®, Luminal®, Tegretol®). · Make sure your doctor knows if you are using any medicines that make you sleepy (such as sleeping pills, cold and allergy medicine, narcotic pain relievers, or sedatives). Warnings While Using This Medicine: · Make sure your doctor knows if you are pregnant or breastfeeding, or if you have kidney or liver disease. · Do not stop using this medicine suddenly without asking your doctor. You may need to slowly decrease your dose before stopping it completely. · This medicine may make you dizzy or drowsy. Avoid driving, using machines, or doing anything else that could be dangerous if you are not alert. Possible Side Effects While Using This Medicine:  
Call your doctor right away if you notice any of these side effects: 
· Fast or pounding heartbeat · Numbness or tingling feeling · Tremors or shaking If you notice these less serious side effects, talk with your doctor: · Drowsiness or weakness · Dry mouth · Feeling restless or nervous, trouble sleeping · Headache · Nausea, constipation, upset stomach If you notice other side effects that you think are caused by this medicine, tell your doctor. Call your doctor for medical advice about side effects. You may report side effects to FDA at 3-970-FDA-6395 © 2017 [address] Information is for End User's use only and may not be sold, redistributed or otherwise used for commercial purposes. The above information is an  only. It is not intended as medical advice for individual conditions or treatments. Talk to your doctor, nurse or pharmacist before following any medical regimen to see if it is safe and effective for you. Introducing [facility]! Dear Suyapa Love: Thank you for requesting a [employer] account. Our records indicate that you already have an active [employer] account. You can access your account anytime at [email or web address] Did you know that you can access your hospital and ER discharge instructions at any time in [employer]? You can also review all of your test results from your hospital stay or ER visit. Additional Information If you have questions, please visit the Frequently Asked Questions section of the [employer] website at [email or web address] Remember, [employer] is NOT to be used for urgent needs. For medical emergencies, dial 911. Now available from your iPhone and Android! Please provide this summary of care documentation to your next provider. Your primary care clinician is listed as ROHITH ROD. If you have any questions after today's visit, please call [phone].

## 2018-08-09 NOTE — PROGRESS NOTES
Chief Complaint   Patient presents with    Hypertension    Depression     Patient is here to be seen for untreated hypertension and worsening depression. 1. Have you been to the ER, urgent care clinic since your last visit? Hospitalized since your last visit? No    2. Have you seen or consulted any other health care providers outside of the [address] [place] [address] since your last visit? Include any pap smears or colon screening. Yes. Patient has seen GYN.

## 2018-08-09 NOTE — PROGRESS NOTES
Subjective:     Jeannie Maravilla is a 41 y.o. female who presents today with the following:  Chief Complaint   Patient presents with    Hypertension    Depression     HTN  Summer Slade Delgado is a 41 y.o. female with hypertension. Hypertension ROS: patient does not perform home BP monitoring, no TIA's, no chest pain on exertion, no dyspnea on exertion, no swelling of ankles. New concerns: see below, recently stopped drinking alcohol. Depression and anxiety  Patiient here today to discuss medication. States she has been feeling more depressed and anxious recently; Patient is non compliant with medication; once she starts drinking a lot she tends to stop medication all together. Alcohol: daily use with at least a bottle of wine or more for the last several months. She has struggled with alcohol abuse in the past but feels this time is worse because she feels so anxious and scared all the time. Last drink of alcohol was on Monday. Support system includes , but does not have a lot of support from him. Feels supported by her mother. Has been to [place] [address] in the past but is not ready to go at this time. Is ready to be sober for an extended period of time (forever) because she really does dread the depression and anxiety that comes with drinking excessively. Has some suicidal ideation, but states she would never follow through because she has her children to think of (ages 6 and 15). ROS:  Gen: denies fever, chills, fatigue, weight loss, weight gain  HEENT:denies blurry vision, nasal congestion, sore throat  Resp: denies dypsnea, cough, wheezing  CV: denies chest pain, palpitations, lower extremity edema  Abd: denies nausea, vomiting, diarrhea, constipation      No Known Allergies      Current Outpatient Prescriptions:     ketoconazole (NIZORAL) 2 % topical cream, Apply  to affected area daily. , Disp: 15 g, Rfl: 0    busPIRone (BUSPAR) 5 mg tablet, Take 1 Tab by mouth three (3) times daily (with meals). , Disp: 90 Tab, Rfl: 2    citalopram (CELEXA) 40 mg tablet, Take 1 Tab by mouth daily. , Disp: 30 Tab, Rfl: 2    Past Medical History:   Diagnosis Date    Anxiety and depression     Hypertension        History reviewed. No pertinent surgical history. History   Smoking Status    Current Some Day Smoker    Packs/day: 0.25   Smokeless Tobacco    Never Used     Comment: no vaping,        Social History     Social History    Marital status:      Spouse name: N/A    Number of children: N/A    Years of education: N/A     Social History Main Topics    Smoking status: Current Some Day Smoker     Packs/day: 0.25    Smokeless tobacco: Never Used      Comment: no vaping,     Alcohol use Yes    Drug use: No    Sexual activity: Yes     Other Topics Concern    None     Social History Narrative       Family History   Problem Relation Age of Onset    Cancer Maternal Grandfather          Objective:     Visit Vitals    BP (!) 157/98 (BP 1 Location: Left arm, BP Patient Position: Sitting)    Pulse 88    Temp 98 °F (36.7 °C) (Oral)    Resp 14    Ht 5' 3\" (1.6 m)    Wt 206 lb (93.4 kg)    LMP 07/12/2018    BMI 36.49 kg/m2     Gen: alert, oriented, no acute distress  Head: normocephalic, atraumatic  Eyes:sclera clear, conjunctiva clear  Oral: moist mucus membranes, no oral lesions, no pharyngeal exudate, no pharyngeal erythema  Neck: symmetric normal sized thyroid, no carotid bruits, no JVD  Resp: Normal work of breathing, lungs CTAB, no w/r/r  CV: S1, S2 normal.  No murmurs, rubs, or gallops. Abd:  Normal bowel sounds. Soft, not tender, not distended. Skin: no rash             Extremities: no edema    No results found for this visit on 08/09/18. Assessment/ Plan:   Diagnoses and all orders for this visit:    1. Uncomplicated alcohol dependence ([address]  -     REFERRAL TO PSYCHIATRY    2.  Anxiety and depression  -     REFERRAL TO PSYCHIATRY    3. Candidal intertrigo  - ketoconazole (NIZORAL) 2 % topical cream; Apply  to affected area daily. discussed at length options for alcohol abuse support including outpatient treatment centers. Info given for akin lundy, along with referrals to psychiatry. I feel she will benefit from specialist care given comorbid anxiety/depression/alcohol abuse. Pt is ready and willing to be sober for extended period of time. Encouraged her to find support systems if she is not ready to go to AA. Restart celexa, add buspar to help with anxiety. Advised patient she should NOT drink at all while on these medications. Follow up 2 weeks for med eval. Sooner prn. Kumar Muñiz MD    Verbal and written instructions (see AVS) provided.  Patient expresses understanding of diagnosis and treatment plan. Ruma Soto.  Kandis Lundborg, MD

## 2018-08-09 NOTE — PATIENT INSTRUCTIONS
[id number] [facility]: See Referral for phone      [address]  [place], [address]  Phone [phone],   [place], [address],  [phone]    [facility]  Address: [address] [place], [address]  Phone: [phone]                  Buspirone (By mouth)   Buspirone ([id number])  Treats anxiety. Brand Name(s):   There may be other brand names for this medicine. When This Medicine Should Not Be Used: You should not use this medicine if you have had an allergic reaction to buspirone. How to Use This Medicine:   Tablet  · Your doctor will tell you how much of this medicine to take and how often. Do not take more medicine or take it more often than your doctor tells you to. · You may take this medicine with or without food, but take it the same way each time. · You may need to take this medicine for 1 or 2 weeks before you begin to feel better. If a dose is missed:   · If you miss a dose or forget to take your medicine, take it as soon as you can. If it is almost time for your next dose, wait until then to take the medicine and skip the missed dose. · Do not use extra medicine to make up for a missed dose. How to Store and Dispose of This Medicine:   · Store the medicine at room temperature, away from heat, moisture, and direct light. · Keep all medicine out of the reach of children and never share your medicine with anyone. Drugs and Foods to Avoid:   Ask your doctor or pharmacist before using any other medicine, including over-the-counter medicines, vitamins, and herbal products. · You should not use buspirone when you are also using an MAO inhibitor (such as Eldepryl®, Marplan®, Nardil®, Parnate®). · Do not eat grapefruit, drink grapefruit juice, or drink alcohol while you are using buspirone.   · Make sure your doctor knows if you are also using cimetidine (Tagamet®), dexamethasone (Decadron®), diltiazem (Juleen Natter), erythromycin (Erythro-Tab®), itraconazole (Sporanox®), nefazodone (Serzone®), rifampin (Rifadin®, Rifamate®, Rifater®), verapamil (Calan®, Covera®), or medicine for seizures (such as Dilantin®, Luminal®, Tegretol®). · Make sure your doctor knows if you are using any medicines that make you sleepy (such as sleeping pills, cold and allergy medicine, narcotic pain relievers, or sedatives). Warnings While Using This Medicine:   · Make sure your doctor knows if you are pregnant or breastfeeding, or if you have kidney or liver disease. · Do not stop using this medicine suddenly without asking your doctor. You may need to slowly decrease your dose before stopping it completely. · This medicine may make you dizzy or drowsy. Avoid driving, using machines, or doing anything else that could be dangerous if you are not alert. Possible Side Effects While Using This Medicine:   Call your doctor right away if you notice any of these side effects:  · Fast or pounding heartbeat  · Numbness or tingling feeling  · Tremors or shaking  If you notice these less serious side effects, talk with your doctor:   · Drowsiness or weakness  · Dry mouth  · Feeling restless or nervous, trouble sleeping  · Headache  · Nausea, constipation, upset stomach  If you notice other side effects that you think are caused by this medicine, tell your doctor. Call your doctor for medical advice about side effects. You may report side effects to FDA at 9-955-FDA-9520  © 2017 [address] Information is for End User's use only and may not be sold, redistributed or otherwise used for commercial purposes. The above information is an  only. It is not intended as medical advice for individual conditions or treatments. Talk to your doctor, nurse or pharmacist before following any medical regimen to see if it is safe and effective for you.

## 2018-08-24 ENCOUNTER — OFFICE VISIT (OUTPATIENT)
Dept: FAMILY MEDICINE CLINIC | Age: 38
End: 2018-08-24

## 2018-08-24 VITALS
HEIGHT: 63 IN | SYSTOLIC BLOOD PRESSURE: 130 MMHG | TEMPERATURE: 98 F | RESPIRATION RATE: 12 BRPM | DIASTOLIC BLOOD PRESSURE: 90 MMHG | WEIGHT: 208 LBS | HEART RATE: 79 BPM | OXYGEN SATURATION: 98 % | BODY MASS INDEX: 36.86 KG/M2

## 2018-08-24 DIAGNOSIS — F32.A ANXIETY AND DEPRESSION: Primary | ICD-10-CM

## 2018-08-24 DIAGNOSIS — I10 ESSENTIAL HYPERTENSION: ICD-10-CM

## 2018-08-24 DIAGNOSIS — F41.9 ANXIETY AND DEPRESSION: Primary | ICD-10-CM

## 2018-08-24 DIAGNOSIS — F10.20 UNCOMPLICATED ALCOHOL DEPENDENCE (HCC): ICD-10-CM

## 2018-08-24 RX ORDER — CITALOPRAM 40 MG/1
40 TABLET, FILM COATED ORAL DAILY
Qty: 30 TAB | Refills: 2 | Status: SHIPPED | OUTPATIENT
Start: 2018-08-24 | End: 2018-09-05 | Stop reason: SDUPTHER

## 2018-08-24 RX ORDER — BUSPIRONE HYDROCHLORIDE 5 MG/1
5 TABLET ORAL
Qty: 90 TAB | Refills: 2 | Status: SHIPPED | OUTPATIENT
Start: 2018-08-24 | End: 2018-09-05 | Stop reason: SDUPTHER

## 2018-08-24 NOTE — PROGRESS NOTES
Chief Complaint   Patient presents with    Medication Evaluation     Patient is here for a follow up.

## 2018-08-24 NOTE — PROGRESS NOTES
Subjective:     Madeline Cardona is a 41 y.o. female who presents today with the following:  Chief Complaint   Patient presents with    Medication Evaluation     Anxiety and Depression  Patient here for follow up. Doing much better overall. Feels like she is \"out of the hole\". Denies SI/HI. No side effects on medications noted. Feels less anxious, and calm. Taking Buspar TID and celexa once a day. Patient has appt with Maria R Kinney on 9/10/18. Alcohol dependence  Patient quit drinking alcohol, last drink was 8/9/18. Patient states she does not want to consider drinking again; she doesn't like the after effects of the alcohol (severe anxiety and depression). Patient realizes that she cannot drink at all, she knows that even if she has one drink one time she realizes eventually she will spiral.  Patient is not going to [place] [address] right now, wants to give it a little time before she does this. Wt Readings from Last 3 Encounters:   08/24/18 208 lb (94.3 kg)   08/09/18 206 lb (93.4 kg)   05/31/18 211 lb 6.4 oz (95.9 kg)       HTN  Madeline Cardona is a 41 y.o. female with hypertension. Hypertension ROS: taking medications as instructed, no medication side effects noted, no TIA's, no chest pain on exertion, no dyspnea on exertion, no swelling of ankles. New concerns: bp elevated on 2 separate occasions. ROS:  Gen: denies fever, chills, fatigue, weight loss, weight gain  HEENT:denies blurry vision, nasal congestion, sore throat  Resp: denies dypsnea, cough, wheezing  CV: denies chest pain, palpitations, lower extremity edema  Abd: denies nausea, vomiting, diarrhea, constipation  Neuro: denies numbness/tingling    No Known Allergies      Current Outpatient Prescriptions:     citalopram (CELEXA) 40 mg tablet, Take 1 Tab by mouth daily. , Disp: 30 Tab, Rfl: 0    busPIRone (BUSPAR) 5 mg tablet, Take 1 Tab by mouth three (3) times daily (with meals). , Disp: 90 Tab, Rfl: 0    ketoconazole (NIZORAL) 2 % topical cream, Apply  to affected area daily. , Disp: 15 g, Rfl: 0    Past Medical History:   Diagnosis Date    Anxiety and depression     Hypertension        History reviewed. No pertinent surgical history. History   Smoking Status    Current Some Day Smoker    Packs/day: 0.25   Smokeless Tobacco    Never Used     Comment: no vaping,        Social History     Social History    Marital status:      Spouse name: N/A    Number of children: N/A    Years of education: N/A     Social History Main Topics    Smoking status: Current Some Day Smoker     Packs/day: 0.25    Smokeless tobacco: Never Used      Comment: no vaping,     Alcohol use Yes    Drug use: No    Sexual activity: Yes     Other Topics Concern    None     Social History Narrative       Family History   Problem Relation Age of Onset    Cancer Maternal Grandfather          Objective:     Visit Vitals    /90    Pulse 79    Temp 98 °F (36.7 °C) (Oral)    Resp 12    Ht 5' 3\" (1.6 m)    Wt 208 lb (94.3 kg)    LMP 08/14/2018    SpO2 98%    BMI 36.85 kg/m2     Gen: alert, oriented, no acute distress  Head: normocephalic, atraumatic  Eyes:sclera clear, conjunctiva clear  Oral: moist mucus membranes, no oral lesions, no pharyngeal exudate, no pharyngeal erythema  Neck: symmetric normal sized thyroid, no carotid bruits, no JVD  Resp: Normal work of breathing, lungs CTAB, no w/r/r  CV: S1, S2 normal.  No murmurs, rubs, or gallops. Abd:  Normal bowel sounds. Soft, not tender, not distended. Skin: no rash             Extremities: no edema    No results found for this visit on 08/24/18. Assessment/ Plan:   Diagnoses and all orders for this visit:    1. Anxiety and depression  -improved on increase dose of celexa and buspar  -continue current meds  -psych appt set up    2.  Uncomplicated alcohol dependence ([address]  -pt currently sober  -encouraged her and praised her for making this decision  -discussed support systems  -she knows she should not drink at all    3. Essential hypertension  -recommended medication but patient declines at this time  -is exercising more often, and eating healthier so wants to stick with this  -recheck bp at next visit      4. BMI 36.0-36.9,adult   Healthy balanced diet low in carbohydrates, saturated fats and red meats and rich in fiber, protein, fruits and vegetables recommended. Recommended 30 minutes cardiovascular exercise such as brisk walking/running at leas 3-5x a week. Follow up 3 months, sooner if needed. Verbal and written instructions (see AVS) provided.  Patient expresses understanding of diagnosis and treatment plan. Monica Boss MD

## 2018-09-05 DIAGNOSIS — F32.A ANXIETY AND DEPRESSION: ICD-10-CM

## 2018-09-05 DIAGNOSIS — F41.9 ANXIETY AND DEPRESSION: ICD-10-CM

## 2018-09-05 RX ORDER — BUSPIRONE HYDROCHLORIDE 5 MG/1
5 TABLET ORAL
Qty: 90 TAB | Refills: 2 | Status: SHIPPED | OUTPATIENT
Start: 2018-09-05 | End: 2020-08-06

## 2018-09-05 RX ORDER — CITALOPRAM 40 MG/1
40 TABLET, FILM COATED ORAL DAILY
Qty: 30 TAB | Refills: 2 | Status: SHIPPED | OUTPATIENT
Start: 2018-09-05 | End: 2019-03-13 | Stop reason: SDUPTHER

## 2018-09-05 NOTE — TELEPHONE ENCOUNTER
From: Zuleyka Viveros  To: Kumar Muñiz MD  Sent: 9/5/2018 3:14 PM EDT  Subject: Medication Renewal Request    Original authorizing provider: MD Zuleyka Neumann would like a refill of the following medications:  busPIRone (BUSPAR) 5 mg tablet Kumar Muñiz MD]  citalopram (CELEXA) 40 mg tablet Kumar Muñiz MD]    Preferred pharmacy: [facility]    Comment:  Please refill. I only have two days left. Thank you!

## 2018-09-10 ENCOUNTER — OFFICE VISIT (OUTPATIENT)
Dept: BEHAVIORAL/MENTAL HEALTH CLINIC | Age: 38
End: 2018-09-10

## 2018-09-10 VITALS
DIASTOLIC BLOOD PRESSURE: 97 MMHG | HEIGHT: 63 IN | BODY MASS INDEX: 36.68 KG/M2 | SYSTOLIC BLOOD PRESSURE: 142 MMHG | HEART RATE: 81 BPM | WEIGHT: 207 LBS

## 2018-09-10 DIAGNOSIS — F32.A ANXIETY AND DEPRESSION: Primary | ICD-10-CM

## 2018-09-10 DIAGNOSIS — F41.9 ANXIETY AND DEPRESSION: Primary | ICD-10-CM

## 2018-09-10 DIAGNOSIS — F10.11 ALCOHOL ABUSE, IN REMISSION: ICD-10-CM

## 2018-09-10 NOTE — PROGRESS NOTES
CHIEF COMPLAINT:  Madeline Cardona is a 41 y.o. female and was seen today to establish psychiatric care. \"I almost canceled because I'm feeling better now I'm on my meds. I always stop taking my meds when I start drinking\". HPI:      Summer reports the following psychiatric symptoms:  denies any current symptoms today. Pt denies any symptoms today. Associated past symptoms include  alcohol abuse, anxiety, anxiety attacks (SOB), feeling depressed, feeling suicidal and tearfulness. ETOH makes symptoms worse. Sobriety makes symptoms better. Pt is here today to discuss a tx plan to continue sobriety. Pts score on the PHQ scale was a 0. This indicates no depression. Pt scored 23 on the HAM-A, which reflects moderate/severe anxiety. Pt screened negative on the MDQ. PAST HISTORY:  Psychiatric:  Past Psychiatric Hospitalization: Denies  Past Outpatient Providers: Denies   Past Psychiatric Medications: Currently, Celexa (has been on for 13 years) and buspar 5mg TID (just started 30 days ago). Medical:obesity and HTN (no current meds). Substance Use:   Illicit: Denies current use. Pt reports THC in the past.   Caffeine: 1 cup of coffee per day. Nicotine: Denies current use. Quit smoking 1 month ago. ETOH: Pt started drinking at age 14years of age. Pt has been abusing alcohol since 2013. Last drink was 8/6/18. Not currently in AA. Has been in the past.     Social:  Marital Status:  for 14 years. Has a good relationship with . Pt  is retired from [employer]. Children: 2 children (5 and 15year old boy). Good relationships with boys. Educational Level:  Some college. Joined [employer] for 2 years. Work History: Worked in [employer] for 10 years. Unemployed currently. Left job at [employer] 1 month ago  Legal History: May 2017 arrested for drunk in public. Pertinent Childhood History: Pt would described her childhood as \"both parents working and being too tried to watch me\".  Pt reports feeling neglected in childhood. 1 younger brother. Grew up with both parents. Mother is her best friend now. Denies any trauma/childhood abuse. Hobbies: Animals and cooking. Coping Skills: Distraction and walking her dogs. Family:  Family history of mental or substance use history reported. Family history of medical problems reviewed. Paternal Grandfather: alcohol abuse  Father: alcohol abuse      MEDICATIONS:  Current Outpatient Prescriptions   Medication Sig Dispense Refill    busPIRone (BUSPAR) 5 mg tablet Take 1 Tab by mouth three (3) times daily (with meals). 90 Tab 2    citalopram (CELEXA) 40 mg tablet Take 1 Tab by mouth daily. 30 Tab 2       ALLERGIES:  No Known Allergies    REVIEW OF SYSTEMS:    Psychiatric:  normal  Appetite:good and no change from normal   Sleep: good but does report some racing thoughts at night. Neuro: Denies seizure hx. Denies dizziness or HA. GI:Denies N/V  CV: Denies chest pain. OB/GYN: Currently, on depo and oral. Negative pregnancy test on 6/18. Denies pregnancy. All other systems reviewed and are considered negative.     MENTAL STATUS EXAM:     Orientation oriented to time, place and person   Vital Signs (BP,Pulse, Temp) See below (reviewed)   Gait and Station Within normal limits   Abnormal Muscular Movements/Tone/Behavior No EPS, no Tardive Dyskinesia, no abnormal muscular movements; wnl tone   Relations cooperative and vague   General Appearance:  age appropriate and casually dressed   Language No aphasia or dysarthria   Speech:  normal pitch and normal volume   Thought Processes logical, wnl rate of thoughts, good abstract reasoning and computation   Thought Associations goal directed   Thought Content free of delusions and free of hallucinations   Suicidal Ideations no plan  and no intention   Homicidal Ideations no plan  and no intention   Mood:  euthymic   Affect:  anxious   Memory recent  adequate   Memory remote:  adequate Concentration/Attention:  adequate   Fund of Knowledge Fair/average   Insight:  fair   Reliability fair   Judgment:  fair     SAFE-T ASSESSMENT:   Risk Factors: ETOH abuse  Protective Factors/strengths: Children and animals   Suicide Thoughts/Plans/Behaviors/Intent: Denies   Assessment of risk/intervention/follow up: Will f/u   Access to guns:  does have guns but she does not have access to keys. VITALS:     Visit Vitals    BP (!) 142/97    Pulse 81    Ht 5' 3\" (1.6 m)    Wt 93.9 kg (207 lb)    LMP 08/14/2018    BMI 36.67 kg/m2       PERTINENT DATA:  No visits with results within 2 Day(s) from this visit. Latest known visit with results is:    Clinical Support on 06/26/2018   Component Date Value Ref Range Status    VALID INTERNAL CONTROL POC 06/26/2018 Yes   Final    HCG urine, Ql. (POC) 06/26/2018 Negative  Negative Final       MEDICAL DECISION MAKING:  Problems addressed today:     ICD-10-CM ICD-9-CM    1. Anxiety and depression F41.9 300.00     F32.9 311    2. Alcohol abuse, in remission F10.11 305.03     30 days sober       Assessment:   Gladys Muñiz is a 41 y.o. female and presents with anxiety and depression that is well managed with current medications. Pt reports that her symptoms are exacerbated with drinking. Pt referred by her PCP for anxiety and depression with ETOH abuse. Pt currently taking Celexa 40mg and Buspar TID 5mg. Will continue current medications due to positive impact on symptoms. Discussed other tx options and discussed the importance of ind therapy and IOP to help remain sober. Pt interested in these interventions. Pt not interested in AA. Pt given resources and will look into them. Pt's BP elevated today but pt reports trying to use other interventions before starting medications. Encouraged pt to start taking fish oil to help with MH symptoms and pt receptive. Provided support and encouragement. Plan:   1.  Medication:      Current Outpatient Prescriptions   Medication Sig Dispense Refill    busPIRone (BUSPAR) 5 mg tablet Take 1 Tab by mouth three (3) times daily (with meals). 90 Tab 2    citalopram (CELEXA) 40 mg tablet Take 1 Tab by mouth daily. 30 Tab 2         Medication changes made today: cont celexa 40mg for anxiety/depression and cont buspar 5mg TID for aug depression/anxiety. 2. Counseling and coordination of care including instructions for treatment, risks/benefits, risk factor reduction and patient/family education. She agrees with the plan. Patient instructed to call with any side effects, questions or issues. 3. Collateral information  4. Individual therapy (smart IOP)  5. Monitor VS and appropriate labs    Follow-up Disposition:  Return in about 4 weeks (around 10/8/2018).     9/10/2018  Nena Cohen NP

## 2018-09-10 NOTE — MR AVS SNAPSHOT
[address] [facility] 
[phone] Patient: Nataliia Carrion MRN: [id number] KPN:[birth year] Visit Information Date & Time Provider Department Dept. Phone Encounter #  
 9/10/2018  1:30 PM Mary Mackenzie NP [address] [facility] [phone] [id number] Follow-up Instructions Return in about 4 weeks (around 10/8/2018). Upcoming Health Maintenance Date Due Influenza Age 5 to Adult 8/1/2018 PAP AKA CERVICAL CYTOLOGY 5/15/2020 DTaP/Tdap/Td series (2 - Td) 5/15/2027 Allergies as of 9/10/2018  Review Complete On: 9/10/2018 By: Alvino García No Known Allergies Current Immunizations  Reviewed on 1/15/2018 Name Date Influenza Vaccine 10/30/2015, 10/17/2014, 11/26/2012, 11/5/2008, 11/2/2007 Influenza Vaccine (Quad) PF 10/27/2017, 10/17/2016 TB Skin Test (PPD) 8/7/2012 Not reviewed this visit Vitals BP Pulse Height(growth percentile) Weight(growth percentile) LMP BMI  
 (!) 142/97 81 5' 3\" (1.6 m) 207 lb (93.9 kg) 08/14/2018 36.67 kg/m2 OB Status Smoking Status Having regular periods Current Some Day Smoker Vitals History BMI and BSA Data Body Mass Index Body Surface Area  
 36.67 kg/m 2 2.04 m 2 Preferred Pharmacy Pharmacy Name Phone [facility], [address] [phone] Your Updated Medication List  
  
   
This list is accurate as of 9/10/18  2:10 PM.  Always use your most recent med list.  
  
  
  
  
 busPIRone 5 mg tablet Commonly known as:  BUSPAR Take 1 Tab by mouth three (3) times daily (with meals). citalopram 40 mg tablet Commonly known as:  [place] Take 1 Tab by mouth daily. Follow-up Instructions Return in about 4 weeks (around 10/8/2018). Introducing [facility]! Dear Adonay Oar: Thank you for requesting a [employer] account. Our records indicate that you already have an active Vigoda account. You can access your account anytime at [email or web address]. [email or web address] Did you know that you can access your hospital and ER discharge instructions at any time in Vig[employer]? You can also review all of your test results from your hospital stay or ER visit. Additional Information If you have questions, please visit the Frequently Asked Questions section of the Vigoda website at [email or web address] [email or web address]/. Remember, [employer]oda is NOT to be used for urgent needs. For medical emergencies, dial 911. Now available from your iPhone and Android! Please provide this summary of care documentation to your next provider. Your primary care clinician is listed as ROHITH ROD. If you have any questions after today's visit, please call [phone].

## 2018-10-14 ENCOUNTER — PATIENT MESSAGE (OUTPATIENT)
Dept: FAMILY MEDICINE CLINIC | Age: 38
End: 2018-10-14

## 2018-10-15 NOTE — TELEPHONE ENCOUNTER
From: Latosha Howard  To: Joanne London NP  Sent: 10/14/2018 7:55 AM EDT  Subject: Prescription Question    Good Morning! I am currently taking a sample pack of birth control until I have my outpatient surgery done on November 4th, Novasure and tubal ligation, through [facility] with Dr. Yany Rodriguez, which will take place at [facility]. I am almost out of my last pack of sample birth control and was wondering if I can get one more pack, to keep my period on track and make sure I'm not bleeding before/during my surgery? I just don't want to hinder my upcoming appointment. Thank you, [employer].

## 2018-10-16 ENCOUNTER — CLINICAL SUPPORT (OUTPATIENT)
Dept: FAMILY MEDICINE CLINIC | Age: 38
End: 2018-10-16

## 2018-10-16 VITALS — TEMPERATURE: 98.6 F

## 2018-10-16 DIAGNOSIS — Z23 ENCOUNTER FOR IMMUNIZATION: Primary | ICD-10-CM

## 2018-10-16 NOTE — MR AVS SNAPSHOT
[address] [facility] 
 
 
 [address], [place] [address] 
[phone] Patient: Nataliia Carrion MRN: [id number] MYS:[birth year] Visit Information Date & Time Provider Department Dept. Phone Encounter #  
 10/16/2018  3:00 PM Halima Romero [facility] 68 [phone] [id number] Upcoming Health Maintenance Date Due Influenza Age 5 to Adult 8/1/2018 PAP AKA CERVICAL CYTOLOGY 5/15/2020 DTaP/Tdap/Td series (2 - Td) 5/15/2027 Allergies as of 10/16/2018  Review Complete On: 10/16/2018 By: Nissa Kinsey RN No Known Allergies Current Immunizations  Reviewed on 1/15/2018 Name Date Influenza Vaccine 10/30/2015, 10/17/2014, 11/26/2012, 11/5/2008, 11/2/2007 Influenza Vaccine (Quad) PF 10/16/2018, 10/27/2017, 10/17/2016 TB Skin Test (PPD) 8/7/2012 Not reviewed this visit You Were Diagnosed With   
  
 Codes Comments Encounter for immunization    -  Primary ICD-10-CM: T29 ICD-9-CM: V03.89 Vitals Temp OB Status Smoking Status 98.6 °F (37 °C) (Oral) Having regular periods Current Some Day Smoker Preferred Pharmacy Pharmacy Name Phone [facility], [address] [phone] Your Updated Medication List  
  
   
This list is accurate as of 10/16/18  3:24 PM.  Always use your most recent med list.  
  
  
  
  
 busPIRone 5 mg tablet Commonly known as:  BUSPAR Take 1 Tab by mouth three (3) times daily (with meals). citalopram 40 mg tablet Commonly known as:  Albin Shape Take 1 Tab by mouth daily. norethindrone-e.estradiol-iron 1 mg-10 mcg (24)/10 mcg (2) Tab Commonly known as:  LO LOESTRIN FE Take 1 Tab by mouth daily. We Performed the Following INFLUENZA VIRUS VAC QUAD,SPLIT,PRESV FREE SYRINGE IM [id number] CPT(R)] MO IMMUNIZ ADMIN,1 SINGLE/COMB VAC/TOXOID [id number] CPT(R)] Patient Instructions Vaccine Information Statement Influenza (Flu) Vaccine (Inactivated or Recombinant): What you need to know Many Vaccine Information Statements are available in [place] and other languages. See www.immunize.org/vis Hojas de Información Sobre Vacunas están disponibles en Español y en muchos otros idiomas. Visite www.immunize.org/vis 1. Why get vaccinated? Influenza (flu) is a contagious disease that spreads around the [facility] every year, usually between October and May. Flu is caused by influenza viruses, and is spread mainly by coughing, sneezing, and close contact. Anyone can get flu. Flu strikes suddenly and can last several days. Symptoms vary by age, but can include: 
 fever/chills  sore throat  muscle aches  fatigue  cough  headache  runny or stuffy nose Flu can also lead to pneumonia and blood infections, and cause diarrhea and seizures in children. If you have a medical condition, such as heart or lung disease, flu can make it worse. Flu is more dangerous for some people. Infants and young children, people 72years of age and older, pregnant women, and people with certain health conditions or a weakened immune system are at greatest risk. Each year thousands of people in the [facility] die from flu, and many more are hospitalized. Flu vaccine can: 
 keep you from getting flu, 
 make flu less severe if you do get it, and 
 keep you from spreading flu to your family and other people. 2. Inactivated and recombinant flu vaccines A dose of flu vaccine is recommended every flu season. Children 6 months through 6years of age may need two doses during the same flu season. Everyone else needs only one dose each flu season.   
 
 
Some inactivated flu vaccines contain a very small amount of a mercury-based preservative called thimerosal. Studies have not shown thimerosal in vaccines to be harmful, but flu vaccines that do not contain thimerosal are available. There is no live flu virus in flu shots. They cannot cause the flu. There are many flu viruses, and they are always changing. Each year a new flu vaccine is made to protect against three or four viruses that are likely to cause disease in the upcoming flu season. But even when the vaccine doesnt exactly match these viruses, it may still provide some protection Flu vaccine cannot prevent: 
 flu that is caused by a virus not covered by the vaccine, or 
 illnesses that look like flu but are not. It takes about 2 weeks for protection to develop after vaccination, and protection lasts through the flu season. 3. Some people should not get this vaccine Tell the person who is giving you the vaccine:  If you have any severe, life-threatening allergies. If you ever had a life-threatening allergic reaction after a dose of flu vaccine, or have a severe allergy to any part of this vaccine, you may be advised not to get vaccinated. Most, but not all, types of flu vaccine contain a small amount of egg protein.  If you ever had Guillain-Barré Syndrome (also called GBS). Some people with a history of GBS should not get this vaccine. This should be discussed with your doctor.  If you are not feeling well. It is usually okay to get flu vaccine when you have a mild illness, but you might be asked to come back when you feel better. 4. Risks of a vaccine reaction With any medicine, including vaccines, there is a chance of reactions. These are usually mild and go away on their own, but serious reactions are also possible. Most people who get a flu shot do not have any problems with it. Minor problems following a flu shot include:  
 soreness, redness, or swelling where the shot was given  hoarseness  sore, red or itchy eyes  cough  fever  aches  headache  itching  fatigue If these problems occur, they usually begin soon after the shot and last 1 or 2 days. More serious problems following a flu shot can include the following:  There may be a small increased risk of Guillain-Barré Syndrome (GBS) after inactivated flu vaccine. This risk has been estimated at 1 or 2 additional cases per million people vaccinated. This is much lower than the risk of severe complications from flu, which can be prevented by flu vaccine.  Young children who get the flu shot along with pneumococcal vaccine (PCV13) and/or DTaP vaccine at the same time might be slightly more likely to have a seizure caused by fever. Ask your doctor for more information. Tell your doctor if a child who is getting flu vaccine has ever had a seizure. Problems that could happen after any injected vaccine:  People sometimes faint after a medical procedure, including vaccination. Sitting or lying down for about 15 minutes can help prevent fainting, and injuries caused by a fall. Tell your doctor if you feel dizzy, or have vision changes or ringing in the ears.  Some people get severe pain in the shoulder and have difficulty moving the arm where a shot was given. This happens very rarely.  Any medication can cause a severe allergic reaction. Such reactions from a vaccine are very rare, estimated at about 1 in a million doses, and would happen within a few minutes to a few hours after the vaccination. As with any medicine, there is a very remote chance of a vaccine causing a serious injury or death. The safety of vaccines is always being monitored. For more information, visit: www.cdc.gov/vaccinesafety/ 
 
5. What if there is a serious reaction? What should I look for?  Look for anything that concerns you, such as signs of a severe allergic reaction, very high fever, or unusual behavior.  
 
Signs of a severe allergic reaction can include hives, swelling of the face and throat, difficulty breathing, a fast heartbeat, dizziness, and weakness  usually within a few minutes to a few hours after the vaccination. What should I do?  If you think it is a severe allergic reaction or other emergency that cant wait, call 9-1-1 and get the person to the nearest hospital. Otherwise, call your doctor.  Reactions should be reported to the Vaccine Adverse Event Reporting System (VAERS). Your doctor should file this report, or you can do it yourself through  the VAERS web site at www.vaers. [facility].gov, or by calling 9-608.161.2401. VAERS does not give medical advice. 6. The National Vaccine Injury Compensation Program 
 
The [facility] Vaccine Injury Compensation Program (VICP) is a federal program that was created to compensate people who may have been injured by certain vaccines. Persons who believe they may have been injured by a vaccine can learn about the program and about filing a claim by calling 3-709.264.9237 or visiting the [employer] website at www.[facility].gov/vaccinecompensation. There is a time limit to file a claim for compensation. 7. How can I learn more?  Ask your healthcare provider. He or she can give you the vaccine package insert or suggest other sources of information.  Call your local or state health department.  Contact the Centers for Disease Control and Prevention (CDC): 
- Call 7-540.471.1383 (7-439-ROX-INFO) or 
- Visit CDCs website at www.cdc.gov/flu Vaccine Information Statement Inactivated Influenza Vaccine 8/7/2015 
42 MILANA Shabbirberta James [id number] [facility] 1 Centers for Disease Control and Prevention Office Use Only [facility] SERVICES! Dear Lizeth Poole: Thank you for requesting a [employer] account. Our records indicate that you already have an active [employer] account. You can access your account anytime at [email or web address] Did you know that you can access your hospital and ER discharge instructions at any time in [employer]? You can also review all of your test results from your hospital stay or ER visit. Additional Information If you have questions, please visit the Frequently Asked Questions section of the [employer] website at [email or web address] [email or web address] Remember, [employer] is NOT to be used for urgent needs. For medical emergencies, dial 911. Now available from your iPhone and Android! Please provide this summary of care documentation to your next provider. Your primary care clinician is listed as ROHITH ROD. If you have any questions after today's visit, please call [phone].

## 2018-10-16 NOTE — PATIENT INSTRUCTIONS
Vaccine Information Statement    Influenza (Flu) Vaccine (Inactivated or Recombinant): What you need to know    Many Vaccine Information Statements are available in Malay and other languages. See www.immunize.org/vis  Hojas de Información Sobre Vacunas están disponibles en Español y en muchos otros idiomas. Visite www.immunize.org/vis    1. Why get vaccinated? Influenza (flu) is a contagious disease that spreads around the [place] every year, usually between October and May. Flu is caused by influenza viruses, and is spread mainly by coughing, sneezing, and close contact. Anyone can get flu. Flu strikes suddenly and can last several days. Symptoms vary by age, but can include:   fever/chills   sore throat   muscle aches   fatigue   cough   headache    runny or stuffy nose    Flu can also lead to pneumonia and blood infections, and cause diarrhea and seizures in children. If you have a medical condition, such as heart or lung disease, flu can make it worse. Flu is more dangerous for some people. Infants and young children, people 72years of age and older, pregnant women, and people with certain health conditions or a weakened immune system are at greatest risk. Each year thousands of people in the [facility] die from flu, and many more are hospitalized. Flu vaccine can:   keep you from getting flu,   make flu less severe if you do get it, and   keep you from spreading flu to your family and other people. 2. Inactivated and recombinant flu vaccines    A dose of flu vaccine is recommended every flu season. Children 6 months through 6years of age may need two doses during the same flu season. Everyone else needs only one dose each flu season.        Some inactivated flu vaccines contain a very small amount of a mercury-based preservative called thimerosal. Studies have not shown thimerosal in vaccines to be harmful, but flu vaccines that do not contain thimerosal are available. There is no live flu virus in flu shots. They cannot cause the flu. There are many flu viruses, and they are always changing. Each year a new flu vaccine is made to protect against three or four viruses that are likely to cause disease in the upcoming flu season. But even when the vaccine doesnt exactly match these viruses, it may still provide some protection    Flu vaccine cannot prevent:   flu that is caused by a virus not covered by the vaccine, or   illnesses that look like flu but are not. It takes about 2 weeks for protection to develop after vaccination, and protection lasts through the flu season. 3. Some people should not get this vaccine    Tell the person who is giving you the vaccine:     If you have any severe, life-threatening allergies. If you ever had a life-threatening allergic reaction after a dose of flu vaccine, or have a severe allergy to any part of this vaccine, you may be advised not to get vaccinated. Most, but not all, types of flu vaccine contain a small amount of egg protein.  If you ever had Guillain-Barré Syndrome (also called GBS). Some people with a history of GBS should not get this vaccine. This should be discussed with your doctor.  If you are not feeling well. It is usually okay to get flu vaccine when you have a mild illness, but you might be asked to come back when you feel better. 4. Risks of a vaccine reaction    With any medicine, including vaccines, there is a chance of reactions. These are usually mild and go away on their own, but serious reactions are also possible. Most people who get a flu shot do not have any problems with it.      Minor problems following a flu shot include:    soreness, redness, or swelling where the shot was given     hoarseness   sore, red or itchy eyes   cough   fever   aches   headache   itching   fatigue  If these problems occur, they usually begin soon after the shot and last 1 or 2 days. More serious problems following a flu shot can include the following:     There may be a small increased risk of Guillain-Barré Syndrome (GBS) after inactivated flu vaccine. This risk has been estimated at 1 or 2 additional cases per million people vaccinated. This is much lower than the risk of severe complications from flu, which can be prevented by flu vaccine.  Young children who get the flu shot along with pneumococcal vaccine (PCV13) and/or DTaP vaccine at the same time might be slightly more likely to have a seizure caused by fever. Ask your doctor for more information. Tell your doctor if a child who is getting flu vaccine has ever had a seizure. Problems that could happen after any injected vaccine:      People sometimes faint after a medical procedure, including vaccination. Sitting or lying down for about 15 minutes can help prevent fainting, and injuries caused by a fall. Tell your doctor if you feel dizzy, or have vision changes or ringing in the ears.  Some people get severe pain in the shoulder and have difficulty moving the arm where a shot was given. This happens very rarely.  Any medication can cause a severe allergic reaction. Such reactions from a vaccine are very rare, estimated at about 1 in a million doses, and would happen within a few minutes to a few hours after the vaccination. As with any medicine, there is a very remote chance of a vaccine causing a serious injury or death. The safety of vaccines is always being monitored. For more information, visit: www.cdc.gov/vaccinesafety/    5. What if there is a serious reaction? What should I look for?  Look for anything that concerns you, such as signs of a severe allergic reaction, very high fever, or unusual behavior.     Signs of a severe allergic reaction can include hives, swelling of the face and throat, difficulty breathing, a fast heartbeat, dizziness, and weakness - usually within a few minutes to a few hours after the vaccination. What should I do?  If you think it is a severe allergic reaction or other emergency that cant wait, call 9-1-1 and get the person to the nearest hospital. Otherwise, call your doctor.  Reactions should be reported to the Vaccine Adverse Event Reporting System (VAERS). Your doctor should file this report, or you can do it yourself through  the VAERS web site at www.vaers. [facility].gov, or by calling 2-974.385.4379. VAERS does not give medical advice. 6. The National Vaccine Injury Compensation Program    The [facility] Vaccine Injury Compensation Program (VICP) is a federal program that was created to compensate people who may have been injured by certain vaccines. Persons who believe they may have been injured by a vaccine can learn about the program and about filing a claim by calling 4-302.781.7472 or visiting the [employer] website at www.[facility].gov/vaccinecompensation. There is a time limit to file a claim for compensation. 7. How can I learn more?  Ask your healthcare provider. He or she can give you the vaccine package insert or suggest other sources of information.  Call your local or state health department.  Contact the Centers for Disease Control and Prevention (CDC):  - Call 9-185.894.1974 (1-800-CDC-INFO) or  - Visit CDCs website at www.cdc.gov/flu    Vaccine Information Statement   Inactivated Influenza Vaccine   8/7/2015  42 MILANA James 386FS-11    Department of Health and Human Services  Centers for Disease Control and Prevention    Office Use Only

## 2018-12-18 DIAGNOSIS — F32.A ANXIETY AND DEPRESSION: ICD-10-CM

## 2018-12-18 DIAGNOSIS — F41.9 ANXIETY AND DEPRESSION: ICD-10-CM

## 2018-12-19 RX ORDER — BUSPIRONE HYDROCHLORIDE 10 MG/1
TABLET ORAL
Qty: 45 TAB | Refills: 2 | Status: SHIPPED | OUTPATIENT
Start: 2018-12-19 | End: 2020-08-06

## 2019-03-08 ENCOUNTER — PATIENT MESSAGE (OUTPATIENT)
Dept: FAMILY MEDICINE CLINIC | Age: 39
End: 2019-03-08

## 2019-03-08 DIAGNOSIS — Z30.8 ENCOUNTER FOR OTHER CONTRACEPTIVE MANAGEMENT: Primary | ICD-10-CM

## 2019-03-13 DIAGNOSIS — F41.9 ANXIETY AND DEPRESSION: ICD-10-CM

## 2019-03-13 DIAGNOSIS — F32.A ANXIETY AND DEPRESSION: ICD-10-CM

## 2019-03-13 RX ORDER — CITALOPRAM 40 MG/1
40 TABLET, FILM COATED ORAL DAILY
Qty: 90 TAB | Refills: 1 | Status: SHIPPED | OUTPATIENT
Start: 2019-03-13 | End: 2019-04-02 | Stop reason: SDUPTHER

## 2019-03-13 NOTE — TELEPHONE ENCOUNTER
Chief Complaint   Patient presents with    Medication Refill     Last refill: 9/5/2018  Last Ov: 8/24/18

## 2019-03-13 NOTE — TELEPHONE ENCOUNTER
Patient calling in for med. She says she is out and is concerned that because she has been out for 2-3 days, she does not want to \"bottom out\" and experience side effects from drug.

## 2019-03-13 NOTE — TELEPHONE ENCOUNTER
From: Latosha Howard  To: Thaddeus Solis NP  Sent: 3/8/2019 10:39 AM EST  Subject: Referral Request    Good Morning! I am scheduled in April for a follow up at [facility] for my novasure procedure. The office called and stated that i will need a referral. Can you please provide one?     Thank you,   [address]

## 2019-04-02 DIAGNOSIS — F41.9 ANXIETY AND DEPRESSION: ICD-10-CM

## 2019-04-02 DIAGNOSIS — F32.A ANXIETY AND DEPRESSION: ICD-10-CM

## 2019-04-02 RX ORDER — CITALOPRAM 40 MG/1
40 TABLET, FILM COATED ORAL DAILY
Qty: 90 TAB | Refills: 1 | Status: SHIPPED | OUTPATIENT
Start: 2019-04-02 | End: 2020-05-20 | Stop reason: SDUPTHER

## 2019-04-02 NOTE — TELEPHONE ENCOUNTER
Requested Prescriptions     Pending Prescriptions Disp Refills    citalopram (CELEXA) 40 mg tablet 90 Tab 1     Sig: Take 1 Tab by mouth daily. Request made by pharmacy.

## 2019-04-02 NOTE — TELEPHONE ENCOUNTER
Requested Prescriptions     Pending Prescriptions Disp Refills    citalopram (CELEXA) 40 mg tablet 90 Tab 1     Sig: Take 1 Tab by mouth daily.      Future Appointments:  No future appointments.    Last Appointment With Me:  [phone]

## 2019-06-26 ENCOUNTER — PATIENT MESSAGE (OUTPATIENT)
Dept: FAMILY MEDICINE CLINIC | Age: 39
End: 2019-06-26

## 2019-06-26 RX ORDER — KETOCONAZOLE 20 MG/G
CREAM TOPICAL
COMMUNITY
Start: 2018-06-22 | End: 2019-06-26 | Stop reason: SDUPTHER

## 2019-06-26 RX ORDER — KETOCONAZOLE 20 MG/G
CREAM TOPICAL
Qty: 30 G | Refills: 1 | Status: SHIPPED | OUTPATIENT
Start: 2019-06-26 | End: 2020-08-06

## 2019-06-26 NOTE — TELEPHONE ENCOUNTER
From: Mirna Montano  To: Johnna Randall NP  Sent: 6/26/2019 10:56 AM EDT  Subject: Prescription Question    Last year (Aug 2018) I was prescribed Ketoconazole cream, 2%, due to a rash/yeast infection under my breast. I'm experiencing the same issues again and was inquiring about getting a refill. I'm a  and work out 5 days a week. The heat and rubbing of my bra are creating quit an itchy rash underneath. I am using the cream from last year but I will run out by the end of the night. I would really appreciate it. Thanks, Mirna Montano, [phone].

## 2019-11-26 ENCOUNTER — OFFICE VISIT (OUTPATIENT)
Dept: FAMILY MEDICINE CLINIC | Age: 39
End: 2019-11-26

## 2019-11-26 VITALS
SYSTOLIC BLOOD PRESSURE: 163 MMHG | TEMPERATURE: 98.4 F | OXYGEN SATURATION: 98 % | HEIGHT: 63 IN | DIASTOLIC BLOOD PRESSURE: 117 MMHG | RESPIRATION RATE: 18 BRPM | WEIGHT: 215.2 LBS | HEART RATE: 106 BPM | BODY MASS INDEX: 38.13 KG/M2

## 2019-11-26 DIAGNOSIS — J01.00 ACUTE NON-RECURRENT MAXILLARY SINUSITIS: Primary | ICD-10-CM

## 2019-11-26 RX ORDER — DOXYCYCLINE 100 MG/1
100 CAPSULE ORAL 2 TIMES DAILY
Qty: 20 CAP | Refills: 0 | Status: SHIPPED | OUTPATIENT
Start: 2019-11-26 | End: 2019-12-06

## 2019-11-26 RX ORDER — BENZONATATE 200 MG/1
200 CAPSULE ORAL
Qty: 40 CAP | Refills: 1 | Status: SHIPPED | OUTPATIENT
Start: 2019-11-26 | End: 2019-12-03

## 2019-11-26 NOTE — PROGRESS NOTES
Chief Complaint   Patient presents with    Sinus Infection     Patient states she has been sick for 2 weeks, sinus infection for 1 week. 1. Have you been to the ER, urgent care clinic since your last visit? Hospitalized since your last visit? No    2. Have you seen or consulted any other health care providers outside of the [address] [place] [address] since your last visit? Include any pap smears or colon screening.  No    Visit Vitals  BP (!) 163/117 (BP 1 Location: Right arm, BP Patient Position: Sitting)   Pulse (!) 106   Temp 98.4 °F (36.9 °C) (Oral)   Resp 18   Ht 5' 3\" (1.6 m)   Wt 215 lb 3.2 oz (97.6 kg)   SpO2 98%   BMI 38.12 kg/m²

## 2019-11-26 NOTE — PROGRESS NOTES
Chief Complaint   Patient presents with    Sinus Infection       Subjective:   Raúl Galicia is a 46 y.o. female who complains of congestion, post nasal drip and generalized sinus pain for 7 days, gradually worsening since that time. She denies a history of shortness of breath and wheezing. Evaluation to date: none. Treatment to date: OTC products. Patient does smoke cigarettes. Relevant PMH: No pertinent additional PMH. Patient Active Problem List   Diagnosis Code    Severe obesity (BMI 35.0-39. 9) E66.01     No Known Allergies     Review of Systems  Pertinent items are noted in HPI. Objective:     Visit Vitals  BP (!) 163/117 (BP 1 Location: Right arm, BP Patient Position: Sitting)   Pulse (!) 106   Temp 98.4 °F (36.9 °C) (Oral)   Resp 18   Ht 5' 3\" (1.6 m)   Wt 215 lb 3.2 oz (97.6 kg)   SpO2 98%   BMI 38.12 kg/m²     General:  alert, cooperative, no distress   Eyes: conjunctivae/corneas clear. PERRL, EOM's intact. Fundi benign   Ears: normal TM's and external ear canals AU   Sinuses: tenderness over generalized maxillary   Mouth:  Lips, mucosa, and tongue normal. Teeth and gums normal   Neck: supple, symmetrical, trachea midline and no adenopathy. Heart: S1 and S2 normal, no murmurs noted. Lungs: clear to auscultation bilaterally            Assessment/Plan:   sinusitis  Suggested symptomatic OTC remedies. Antibiotics per orders. RTC prn. ICD-10-CM ICD-9-CM    1. Acute non-recurrent maxillary sinusitis J01.00 461.0 doxycycline (MONODOX) 100 mg capsule      benzonatate (TESSALON) 200 mg capsule     Encounter Diagnoses   Name Primary?  Acute non-recurrent maxillary sinusitis Yes     Orders Placed This Encounter    doxycycline (MONODOX) 100 mg capsule    benzonatate (TESSALON) 200 mg capsule   .

## 2020-05-20 DIAGNOSIS — F32.A ANXIETY AND DEPRESSION: ICD-10-CM

## 2020-05-20 DIAGNOSIS — F41.9 ANXIETY AND DEPRESSION: ICD-10-CM

## 2020-05-22 RX ORDER — CITALOPRAM 40 MG/1
40 TABLET, FILM COATED ORAL DAILY
Qty: 90 TAB | Refills: 1 | Status: SHIPPED | OUTPATIENT
Start: 2020-05-22 | End: 2020-05-26 | Stop reason: SDUPTHER

## 2020-05-26 DIAGNOSIS — F41.9 ANXIETY AND DEPRESSION: ICD-10-CM

## 2020-05-26 DIAGNOSIS — F32.A ANXIETY AND DEPRESSION: ICD-10-CM

## 2020-05-26 RX ORDER — CITALOPRAM 40 MG/1
40 TABLET, FILM COATED ORAL DAILY
Qty: 90 TAB | Refills: 1 | Status: SHIPPED | OUTPATIENT
Start: 2020-05-26 | End: 2020-08-06

## 2020-05-26 NOTE — TELEPHONE ENCOUNTER
Requested Prescriptions     Pending Prescriptions Disp Refills    citalopram (CELEXA) 40 mg tablet 90 Tab 1     Sig: Take 1 Tab by mouth daily.

## 2020-08-06 ENCOUNTER — OFFICE VISIT (OUTPATIENT)
Dept: FAMILY MEDICINE CLINIC | Age: 40
End: 2020-08-06
Payer: COMMERCIAL

## 2020-08-06 VITALS
OXYGEN SATURATION: 97 % | TEMPERATURE: 98.2 F | DIASTOLIC BLOOD PRESSURE: 105 MMHG | BODY MASS INDEX: 40.93 KG/M2 | HEART RATE: 74 BPM | HEIGHT: 63 IN | WEIGHT: 231 LBS | RESPIRATION RATE: 16 BRPM | SYSTOLIC BLOOD PRESSURE: 184 MMHG

## 2020-08-06 DIAGNOSIS — Z13.220 SCREENING, LIPID: ICD-10-CM

## 2020-08-06 DIAGNOSIS — F41.9 ANXIETY AND DEPRESSION: ICD-10-CM

## 2020-08-06 DIAGNOSIS — Z13.29 SCREENING FOR THYROID DISORDER: ICD-10-CM

## 2020-08-06 DIAGNOSIS — F32.A ANXIETY AND DEPRESSION: ICD-10-CM

## 2020-08-06 DIAGNOSIS — I10 ESSENTIAL HYPERTENSION: Primary | ICD-10-CM

## 2020-08-06 PROCEDURE — 99000 SPECIMEN HANDLING OFFICE-LAB: CPT | Performed by: NURSE PRACTITIONER

## 2020-08-06 PROCEDURE — 99214 OFFICE O/P EST MOD 30 MIN: CPT | Performed by: NURSE PRACTITIONER

## 2020-08-06 PROCEDURE — 36415 COLL VENOUS BLD VENIPUNCTURE: CPT | Performed by: NURSE PRACTITIONER

## 2020-08-06 RX ORDER — LISINOPRIL AND HYDROCHLOROTHIAZIDE 10; 12.5 MG/1; MG/1
1 TABLET ORAL DAILY
Qty: 90 TAB | Refills: 0 | Status: SHIPPED | OUTPATIENT
Start: 2020-08-06 | End: 2020-08-12 | Stop reason: SDUPTHER

## 2020-08-06 RX ORDER — CITALOPRAM 40 MG/1
20 TABLET, FILM COATED ORAL DAILY
Qty: 90 TAB | Refills: 1
Start: 2020-08-06

## 2020-08-06 NOTE — PATIENT INSTRUCTIONS
DASH Diet: Care Instructions Your Care Instructions The DASH diet is an eating plan that can help lower your blood pressure. DASH stands for Dietary Approaches to Stop Hypertension. Hypertension is high blood pressure. The DASH diet focuses on eating foods that are high in calcium, potassium, and magnesium. These nutrients can lower blood pressure. The foods that are highest in these nutrients are fruits, vegetables, low-fat dairy products, nuts, seeds, and legumes. But taking calcium, potassium, and magnesium supplements instead of eating foods that are high in those nutrients does not have the same effect. The DASH diet also includes whole grains, fish, and poultry. The DASH diet is one of several lifestyle changes your doctor may recommend to lower your high blood pressure. Your doctor may also want you to decrease the amount of sodium in your diet. Lowering sodium while following the DASH diet can lower blood pressure even further than just the DASH diet alone. Follow-up care is a key part of your treatment and safety. Be sure to make and go to all appointments, and call your doctor if you are having problems. It's also a good idea to know your test results and keep a list of the medicines you take. How can you care for yourself at home? Following the DASH diet · Eat 4 to 5 servings of fruit each day. A serving is 1 medium-sized piece of fruit, ½ cup chopped or canned fruit, 1/4 cup dried fruit, or 4 ounces (½ cup) of fruit juice. Choose fruit more often than fruit juice. · Eat 4 to 5 servings of vegetables each day. A serving is 1 cup of lettuce or raw leafy vegetables, ½ cup of chopped or cooked vegetables, or 4 ounces (½ cup) of vegetable juice. Choose vegetables more often than vegetable juice. · Get 2 to 3 servings of low-fat and fat-free dairy each day. A serving is 8 ounces of milk, 1 cup of yogurt, or 1 ½ ounces of cheese. · Eat 6 to 8 servings of grains each day. A serving is 1 slice of bread, 1 ounce of dry cereal, or ½ cup of cooked rice, pasta, or cooked cereal. Try to choose whole-grain products as much as possible. · Limit lean meat, poultry, and fish to 2 servings each day. A serving is 3 ounces, about the size of a deck of cards. · Eat 4 to 5 servings of nuts, seeds, and legumes (cooked dried beans, lentils, and split peas) each week. A serving is 1/3 cup of nuts, 2 tablespoons of seeds, or ½ cup of cooked beans or peas. · Limit fats and oils to 2 to 3 servings each day. A serving is 1 teaspoon of vegetable oil or 2 tablespoons of salad dressing. · Limit sweets and added sugars to 5 servings or less a week. A serving is 1 tablespoon jelly or jam, ½ cup sorbet, or 1 cup of lemonade. · Eat less than 2,300 milligrams (mg) of sodium a day. If you limit your sodium to 1,500 mg a day, you can lower your blood pressure even more. Tips for success · Start small. Do not try to make dramatic changes to your diet all at once. You might feel that you are missing out on your favorite foods and then be more likely to not follow the plan. Make small changes, and stick with them. Once those changes become habit, add a few more changes. · Try some of the following: ? Make it a goal to eat a fruit or vegetable at every meal and at snacks. This will make it easy to get the recommended amount of fruits and vegetables each day. ? Try yogurt topped with fruit and nuts for a snack or healthy dessert. ? Add lettuce, tomato, cucumber, and onion to sandwiches. ? Combine a ready-made pizza crust with low-fat mozzarella cheese and lots of vegetable toppings. Try using tomatoes, squash, spinach, broccoli, carrots, cauliflower, and onions. ? Have a variety of cut-up vegetables with a low-fat dip as an appetizer instead of chips and dip. ? Sprinkle sunflower seeds or chopped almonds over salads.  Or try adding chopped walnuts or almonds to cooked vegetables. ? Try some vegetarian meals using beans and peas. Add garbanzo or kidney beans to salads. Make burritos and tacos with mashed wooten beans or black beans. Where can you learn more? Go to http://sonia-sagrario.info/ Enter B077 in the search box to learn more about \"DASH Diet: Care Instructions. \" Current as of: December 16, 2019               Content Version: 12.5 © 9798-8638 [employer]. Care instructions adapted under license by [employer] (which disclaims liability or warranty for this information). If you have questions about a medical condition or this instruction, always ask your healthcare professional. [address] any warranty or liability for your use of this information. DASH Diet: Care Instructions Your Care Instructions The DASH diet is an eating plan that can help lower your blood pressure. DASH stands for Dietary Approaches to Stop Hypertension. Hypertension is high blood pressure. The DASH diet focuses on eating foods that are high in calcium, potassium, and magnesium. These nutrients can lower blood pressure. The foods that are highest in these nutrients are fruits, vegetables, low-fat dairy products, nuts, seeds, and legumes. But taking calcium, potassium, and magnesium supplements instead of eating foods that are high in those nutrients does not have the same effect. The DASH diet also includes whole grains, fish, and poultry. The DASH diet is one of several lifestyle changes your doctor may recommend to lower your high blood pressure. Your doctor may also want you to decrease the amount of sodium in your diet. Lowering sodium while following the DASH diet can lower blood pressure even further than just the DASH diet alone. Follow-up care is a key part of your treatment and safety.  Be sure to make and go to all appointments, and call your doctor if you are having problems. It's also a good idea to know your test results and keep a list of the medicines you take. How can you care for yourself at home? Following the DASH diet · Eat 4 to 5 servings of fruit each day. A serving is 1 medium-sized piece of fruit, ½ cup chopped or canned fruit, 1/4 cup dried fruit, or 4 ounces (½ cup) of fruit juice. Choose fruit more often than fruit juice. · Eat 4 to 5 servings of vegetables each day. A serving is 1 cup of lettuce or raw leafy vegetables, ½ cup of chopped or cooked vegetables, or 4 ounces (½ cup) of vegetable juice. Choose vegetables more often than vegetable juice. · Get 2 to 3 servings of low-fat and fat-free dairy each day. A serving is 8 ounces of milk, 1 cup of yogurt, or 1 ½ ounces of cheese. · Eat 6 to 8 servings of grains each day. A serving is 1 slice of bread, 1 ounce of dry cereal, or ½ cup of cooked rice, pasta, or cooked cereal. Try to choose whole-grain products as much as possible. · Limit lean meat, poultry, and fish to 2 servings each day. A serving is 3 ounces, about the size of a deck of cards. · Eat 4 to 5 servings of nuts, seeds, and legumes (cooked dried beans, lentils, and split peas) each week. A serving is 1/3 cup of nuts, 2 tablespoons of seeds, or ½ cup of cooked beans or peas. · Limit fats and oils to 2 to 3 servings each day. A serving is 1 teaspoon of vegetable oil or 2 tablespoons of salad dressing. · Limit sweets and added sugars to 5 servings or less a week. A serving is 1 tablespoon jelly or jam, ½ cup sorbet, or 1 cup of lemonade. · Eat less than 2,300 milligrams (mg) of sodium a day. If you limit your sodium to 1,500 mg a day, you can lower your blood pressure even more. Tips for success · Start small. Do not try to make dramatic changes to your diet all at once. You might feel that you are missing out on your favorite foods and then be more likely to not follow the plan.  Make small changes, and stick with them. Once those changes become habit, add a few more changes. · Try some of the following: ? Make it a goal to eat a fruit or vegetable at every meal and at snacks. This will make it easy to get the recommended amount of fruits and vegetables each day. ? Try yogurt topped with fruit and nuts for a snack or healthy dessert. ? Add lettuce, tomato, cucumber, and onion to sandwiches. ? Combine a ready-made pizza crust with low-fat mozzarella cheese and lots of vegetable toppings. Try using tomatoes, squash, spinach, broccoli, carrots, cauliflower, and onions. ? Have a variety of cut-up vegetables with a low-fat dip as an appetizer instead of chips and dip. ? Sprinkle sunflower seeds or chopped almonds over salads. Or try adding chopped walnuts or almonds to cooked vegetables. ? Try some vegetarian meals using beans and peas. Add garbanzo or kidney beans to salads. Make burritos and tacos with mashed wooten beans or black beans. Where can you learn more? Go to http://soniaZymeworkssagrario.info/ Enter U644 in the search box to learn more about \"DASH Diet: Care Instructions. \" Current as of: December 16, 2019               Content Version: 12.5 © 1639-3496 [employer]. Care instructions adapted under license by [employer] (which disclaims liability or warranty for this information). If you have questions about a medical condition or this instruction, always ask your healthcare professional. [place] [address] any warranty or liability for your use of this information. High Blood Pressure: Care Instructions Overview It's normal for blood pressure to go up and down throughout the day. But if it stays up, you have high blood pressure. Another name for high blood pressure is hypertension. Despite what a lot of people think, high blood pressure usually doesn't cause headaches or make you feel dizzy or lightheaded.  It usually has no symptoms. But it does increase your risk of stroke, heart attack, and other problems. You and your doctor will talk about your risks of these problems based on your blood pressure. Your doctor will give you a goal for your blood pressure. Your goal will be based on your health and your age. Lifestyle changes, such as eating healthy and being active, are always important to help lower blood pressure. You might also take medicine to reach your blood pressure goal. 
Follow-up care is a key part of your treatment and safety. Be sure to make and go to all appointments, and call your doctor if you are having problems. It's also a good idea to know your test results and keep a list of the medicines you take. How can you care for yourself at home? Medical treatment · If you stop taking your medicine, your blood pressure will go back up. You may take one or more types of medicine to lower your blood pressure. Be safe with medicines. Take your medicine exactly as prescribed. Call your doctor if you think you are having a problem with your medicine. · Talk to your doctor before you start taking aspirin every day. Aspirin can help certain people lower their risk of a heart attack or stroke. But taking aspirin isn't right for everyone, because it can cause serious bleeding. · See your doctor regularly. You may need to see the doctor more often at first or until your blood pressure comes down. · If you are taking blood pressure medicine, talk to your doctor before you take decongestants or anti-inflammatory medicine, such as ibuprofen. Some of these medicines can raise blood pressure. · Learn how to check your blood pressure at home. Lifestyle changes · Stay at a healthy weight. This is especially important if you put on weight around the waist. Losing even 10 pounds can help you lower your blood pressure. · If your doctor recommends it, get more exercise.  Walking is a good choice. Bit by bit, increase the amount you walk every day. Try for at least 30 minutes on most days of the week. You also may want to swim, bike, or do other activities. · Avoid or limit alcohol. Talk to your doctor about whether you can drink any alcohol. · Try to limit how much sodium you eat to less than 2,300 milligrams (mg) a day. Your doctor may ask you to try to eat less than 1,500 mg a day. · Eat plenty of fruits (such as bananas and oranges), vegetables, legumes, whole grains, and low-fat dairy products. · Lower the amount of saturated fat in your diet. Saturated fat is found in animal products such as milk, cheese, and meat. Limiting these foods may help you lose weight and also lower your risk for heart disease. · Do not smoke. Smoking increases your risk for heart attack and stroke. If you need help quitting, talk to your doctor about stop-smoking programs and medicines. These can increase your chances of quitting for good. When should you call for help? Call  911 anytime you think you may need emergency care. This may mean having symptoms that suggest that your blood pressure is causing a serious heart or blood vessel problem. Your blood pressure may be over 180/120. For example, call 911 if: 
· You have symptoms of a heart attack. These may include: 
? Chest pain or pressure, or a strange feeling in the chest. 
? Sweating. ? Shortness of breath. ? Nausea or vomiting. ? Pain, pressure, or a strange feeling in the back, neck, jaw, or upper belly or in one or both shoulders or arms. ? Lightheadedness or sudden weakness. ? A fast or irregular heartbeat. · You have symptoms of a stroke. These may include: 
? Sudden numbness, tingling, weakness, or loss of movement in your face, arm, or leg, especially on only one side of your body. ? Sudden vision changes. ? Sudden trouble speaking. ? Sudden confusion or trouble understanding simple statements. ? Sudden problems with walking or balance. ? A sudden, severe headache that is different from past headaches. · You have severe back or belly pain. Do not wait until your blood pressure comes down on its own. Get help right away. Call your doctor now or seek immediate care if: 
· Your blood pressure is much higher than normal (such as 180/120 or higher), but you don't have symptoms. · You think high blood pressure is causing symptoms, such as: 
? Severe headache. 
? Blurry vision. Watch closely for changes in your health, and be sure to contact your doctor if: 
· Your blood pressure measures higher than your doctor recommends at least 2 times. That means the top number is higher or the bottom number is higher, or both. · You think you may be having side effects from your blood pressure medicine. Where can you learn more? Go to http://sonia-sagrario.info/ Enter U423 in the search box to learn more about \"High Blood Pressure: Care Instructions. \" Current as of: December 16, 2019               Content Version: 12.5 © 4510-6431 [employer]. Care instructions adapted under license by [employer] (which disclaims liability or warranty for this information). If you have questions about a medical condition or this instruction, always ask your healthcare professional. [address] any warranty or liability for your use of this information.

## 2020-08-06 NOTE — PROGRESS NOTES
1. Have you been to the ER, urgent care clinic since your last visit? Hospitalized since your last visit? No    2. Have you seen or consulted any other health care providers outside of the [address] [place] [address] since your last visit? Include any pap smears or colon screening.  No     Health Maintenance Due   Topic Date Due    Pneumococcal 0-64 years (1 of 1 - PPSV23) [birth year]/1987    PAP AKA CERVICAL CYTOLOGY  05/15/2020    Influenza Age 9 to Adult  08/01/2020

## 2020-08-06 NOTE — PROGRESS NOTES
Chief Complaint   Patient presents with   [address] [employer] [address] PHYSICAL    Summer [address] 45 y.o. presents for a physical for . Was planning to drive for [facility] but says she sold her house today and they put offer in on a house in [place] yesterday and it was also accepted today. She notes that she has been under a little bit of stress related to selling her house and buying another house, basically in the past 24 hours. Taking celexa daily. Has decreased dose to 20 mg   About a year ago, she had tubes tied and uterine ablation. Says that since the procedure she has felt much better. No anxiety anymore. Not on any BP medication. She has had some elevated BP in the past.  She says she used to be on a medication \"a long time ago and it actually caused me to have a headache\". She denies HA, vision changes, dizziness, CP, ROBERTSON or swelling. Past Medical History:   Diagnosis Date    Anxiety and depression     Hypertension      History reviewed. No pertinent surgical history. Social History     Socioeconomic History    Marital status:      Spouse name: Not on file    Number of children: Not on file    Years of education: Not on file    Highest education level: Not on file   Occupational History    Not on file   Social Needs    Financial resource strain: Not on file    Food insecurity     Worry: Not on file     Inability: Not on file    Transportation needs     Medical: Not on file     Non-medical: Not on file   Tobacco Use    Smoking status: Current Some Day Smoker     Packs/day: 0.25    Smokeless tobacco: Never Used    Tobacco comment: no vaping,    Substance and Sexual Activity    Alcohol use:  Yes    Drug use: No    Sexual activity: Yes   Lifestyle    Physical activity     Days per week: Not on file     Minutes per session: Not on file    Stress: Not on file   Relationships    Social connections     Talks on phone: Not on file     Gets together: Not on file     Attends Gnosticist service: Not on file     Active member of club or organization: Not on file     Attends meetings of clubs or organizations: Not on file     Relationship status: Not on file    Intimate partner violence     Fear of current or ex partner: Not on file     Emotionally abused: Not on file     Physically abused: Not on file     Forced sexual activity: Not on file   Other Topics Concern    Not on file   Social History Narrative    Not on file     Family History   Problem Relation Age of Onset    Cancer Maternal Grandfather        Current Outpatient Medications   Medication Sig Dispense Refill    citalopram (CELEXA) 40 mg tablet Take 1 Tab by mouth daily. 90 Tab 1    ketoconazole (NIZORAL) 2 % topical cream Apply BID to affected area until resolved  May Substitute ANY Generic for Insurance 30 g 1    busPIRone (BUSPAR) 10 mg tablet TAKE 1/2 TABLET BY MOUTH THREE TIMES DAILY (WITH MEALS). 45 Tab 2    norethindrone-e.estradiol-iron (LO LOESTRIN FE) 1 mg-10 mcg (24)/10 mcg (2) tab Take 1 Tab by mouth daily. 1 Package 0    busPIRone (BUSPAR) 5 mg tablet Take 1 Tab by mouth three (3) times daily (with meals). 90 Tab 2         Results for orders placed or performed in visit on 06/26/18   AMB POC URINE PREGNANCY TEST, VISUAL COLOR COMPARISON   Result Value Ref Range    VALID INTERNAL CONTROL POC Yes     HCG urine, Ql. (POC) Negative Negative         ROS: 12 point system revived,please see HPI for pertinent positives. OBJECTIVE:     Visit Vitals  BP (!) 184/105 (BP 1 Location: Left arm, BP Patient Position: Sitting)   Pulse 74   Temp 98.2 °F (36.8 °C) (Temporal)   Resp 16   Ht 5' 3\" (1.6 m)   Wt 231 lb (104.8 kg)   LMP 08/09/2019 (Approximate)   SpO2 97%   BMI 40.92 kg/m²       Vision meet standards and whisper test at 5 feet good.      Hearing Screening    125Hz 250Hz 500Hz 1000Hz 2000Hz 3000Hz 4000Hz 6000Hz 8000Hz   Right ear:   25 25 25  25     Left ear:   25 25 25  25        Visual Acuity Screening    Right eye Left eye Both eyes   Without correction: 20/20 20/20 20/20   With correction:             Physical Examination:    General appearance - alert, well appearing, and in no distress, oriente to person, place, and time and well hydrated    HEENT  PERRLA, EOMI, Sclera clear, anicteric, Oropharynx clear, no lesions. Chest - S1, S2 heard, no peripheral edema. Lungs- Clear to auscultation, no wheezes, rales or rhonchi, symmetric air entry    Neck- Supple, No JVD or bruits appreciated. Neuro- CN 2 to 11 grossly normal, Roomberg's negative, No focal neuro deficits              DTR 2/4, sensations on upper /lower extremity WNL              Able to walk on heels and toes. Muscle strength on upper/lower ext 5/5 satish    Abdomen- ND,NT, Bowel sounds  Positive. ROM: Good ROM of upper and lower extremities. Psy- Mood and Affect WNL. Denies suicidal thoughts. Non verbose      No orders of the defined types were placed in this encounter. ASSESSMENT/PLAN   Differential diagnosis and treatment options reviewed with patient who is in agreement with treatment plan as outlined below. ICD-10-CM ICD-9-CM    1. Essential hypertension  I78 019.8 METABOLIC PANEL, COMPREHENSIVE      CBC WITH AUTOMATED DIFF      IN HANDLG&/OR CONVEY OF SPEC FOR TR OFFICE TO LAB      COLLECTION VENOUS BLOOD,VENIPUNCTURE      lisinopril-hydroCHLOROthiazide (PRINZIDE, ZESTORETIC) 10-12.5 mg per tablet   2. Screening, lipid  Z13.220 V77.91 LIPID PANEL   3. Screening for thyroid disorder  Z13.29 V77.0 TSH 3RD GENERATION   4. Anxiety and depression  F41.9 300.00 citalopram (CELEXA) 40 mg tablet    F32.9 311      BP very elevated today. DASH diet discussed. Will start on BP medication and have her follow up in two weeks for recheck.   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 Labs today. Doing well on celexa 20 mg     I have discussed the diagnosis with the patient and the intended plan as seen in the above orders. The patient has received an after-visit summary and questions were answered concerning future plans. I have discussed medication side effects and warnings with the patient as well. Pt verbalizes understanding.

## 2020-08-12 DIAGNOSIS — I10 ESSENTIAL HYPERTENSION: ICD-10-CM

## 2020-08-12 NOTE — TELEPHONE ENCOUNTER
Lisinopril- Hydrochlorothiazide is currently on back order pt [facility]

## 2020-08-12 NOTE — TELEPHONE ENCOUNTER
----- Message from [facility] sent at 8/11/2020  3:24 PM EDT -----  Regarding: Visit Follow-Up Question  Contact: Hadley Short,  Dr. Marietta Moran sent in a medication to be filled at [facility] on August 6th. [facility] keeps pushing the date back saying that the order hasn't come yet. Can she just fill it by [facility]? That will only take two days, or should I keep waiting on [facility]?     Thank you,   Summer

## 2020-08-13 RX ORDER — LISINOPRIL AND HYDROCHLOROTHIAZIDE 10; 12.5 MG/1; MG/1
1 TABLET ORAL DAILY
Qty: 90 TAB | Refills: 0 | Status: SHIPPED | OUTPATIENT
Start: 2020-08-13

## 2020-09-08 ENCOUNTER — PATIENT MESSAGE (OUTPATIENT)
Dept: FAMILY MEDICINE CLINIC | Age: 40
End: 2020-09-08

## 2020-09-08 RX ORDER — FLUCONAZOLE 150 MG/1
150 TABLET ORAL DAILY
Qty: 2 TAB | Refills: 0 | Status: SHIPPED | OUTPATIENT
Start: 2020-09-08 | End: 2020-09-09

## 2020-09-08 NOTE — TELEPHONE ENCOUNTER
From: Latosha Howard  To: Tiny Amaya NP  Sent: 9/8/2020 3:06 PM EDT  Subject: Prescription Question    Hello! I am in need of medication for a yeast infection. I have been walking/ running several miles a day and this heat has irritated the nether regions. I want to continue walking but I am in a lot of pain from the irritation and scratching. I recieved a medication a couple years ago for this same issue. Thank you!   [employer]   [phone]

## 2023-05-20 RX ORDER — LISINOPRIL AND HYDROCHLOROTHIAZIDE 12.5; 1 MG/1; MG/1
1 TABLET ORAL DAILY
COMMUNITY
Start: 2020-08-13

## 2023-05-20 RX ORDER — CITALOPRAM 40 MG/1
20 TABLET ORAL DAILY
COMMUNITY
Start: 2020-08-06